# Patient Record
Sex: FEMALE | Employment: OTHER | URBAN - METROPOLITAN AREA
[De-identification: names, ages, dates, MRNs, and addresses within clinical notes are randomized per-mention and may not be internally consistent; named-entity substitution may affect disease eponyms.]

---

## 2018-09-12 ENCOUNTER — HOSPITAL ENCOUNTER (OUTPATIENT)
Age: 38
Discharge: HOME | End: 2018-09-12
Payer: COMMERCIAL

## 2018-09-12 ENCOUNTER — APPOINTMENT (OUTPATIENT)
Dept: ULTRASOUND IMAGING | Age: 38
End: 2018-09-12
Attending: NURSE PRACTITIONER
Payer: COMMERCIAL

## 2018-09-12 VITALS
WEIGHT: 108 LBS | DIASTOLIC BLOOD PRESSURE: 70 MMHG | SYSTOLIC BLOOD PRESSURE: 104 MMHG | HEART RATE: 84 BPM | TEMPERATURE: 98 F | RESPIRATION RATE: 16 BRPM | OXYGEN SATURATION: 100 %

## 2018-09-12 DIAGNOSIS — N83.202 CYST OF LEFT OVARY: Primary | ICD-10-CM

## 2018-09-12 DIAGNOSIS — R10.2 PELVIC PAIN: ICD-10-CM

## 2018-09-12 PROCEDURE — 96372 THER/PROPH/DIAG INJ SC/IM: CPT

## 2018-09-12 PROCEDURE — 76830 TRANSVAGINAL US NON-OB: CPT | Performed by: NURSE PRACTITIONER

## 2018-09-12 PROCEDURE — 99204 OFFICE O/P NEW MOD 45 MIN: CPT

## 2018-09-12 PROCEDURE — 99205 OFFICE O/P NEW HI 60 MIN: CPT

## 2018-09-12 PROCEDURE — 93975 VASCULAR STUDY: CPT | Performed by: NURSE PRACTITIONER

## 2018-09-12 PROCEDURE — 76856 US EXAM PELVIC COMPLETE: CPT | Performed by: NURSE PRACTITIONER

## 2018-09-12 RX ORDER — ACETAMINOPHEN 325 MG/1
650 TABLET ORAL ONCE
Status: COMPLETED | OUTPATIENT
Start: 2018-09-12 | End: 2018-09-12

## 2018-09-12 RX ORDER — NITROFURANTOIN 25; 75 MG/1; MG/1
100 CAPSULE ORAL 2 TIMES DAILY
Refills: 0 | COMMUNITY
Start: 2018-09-12 | End: 2018-10-01

## 2018-09-12 RX ORDER — KETOROLAC TROMETHAMINE 30 MG/ML
30 INJECTION, SOLUTION INTRAMUSCULAR; INTRAVENOUS ONCE
Status: COMPLETED | OUTPATIENT
Start: 2018-09-12 | End: 2018-09-12

## 2018-09-12 RX ORDER — TRAMADOL HYDROCHLORIDE 50 MG/1
50 TABLET ORAL EVERY 8 HOURS PRN
Qty: 10 TABLET | Refills: 0 | Status: SHIPPED | OUTPATIENT
Start: 2018-09-12 | End: 2018-09-19

## 2018-09-12 NOTE — ED NOTES
Patient put on call light. Needs to urinate. Ultrasound not available and is with patient. Patient informed and updated on plan .

## 2018-09-12 NOTE — ED NOTES
Patient states she has the urge to void. Phoned Seema. [employer] is with a patient at this time. Informed patient. Patient verbalized understanding. Will continue to monitor.

## 2018-09-12 NOTE — ED NOTES
Pt given 36oz of water to drink. Patient urinated just prior to arrival today. Patient updated on plan of care.

## 2018-09-12 NOTE — ED PROVIDER NOTES
Patient Seen in: [address] [facility] [place]    History   Patient presents with:  Urinary Symptoms (urologic)  Pelvic Pain    Stated Complaint: frequent urination    24-year-old female who presents to the immediate care with complaints of frequent ur negative except as noted above.     Physical Exam     ED Triage Vitals   BP 09/12/18 1217 125/79   Pulse 09/12/18 1217 83   Resp 09/12/18 1217 16   Temp 09/12/18 1220 100 °F (37.8 °C)   Temp src 09/12/18 1220 Temporal   SpO2 09/12/18 1217 100 %   O2 Device Capillary refill takes less than 2 seconds. Psychiatric: She has a normal mood and affect. Her behavior is normal. Judgment and thought content normal.   Nursing note and vitals reviewed.           ED Course   We will defer urine at this time, since derick Karie Jurado MD on 9/12/2018 at 14:58     Approved by: Karie Jurado MD                     [facility]   I have discussed with the patient and/or family the results of test, differential diagnosis, treatment plan, warning signs and symptoms which should prom

## 2018-10-01 ENCOUNTER — OFFICE VISIT (OUTPATIENT)
Dept: UROLOGY | Facility: HOSPITAL | Age: 38
End: 2018-10-01
Attending: OBSTETRICS & GYNECOLOGY
Payer: COMMERCIAL

## 2018-10-01 VITALS
DIASTOLIC BLOOD PRESSURE: 58 MMHG | WEIGHT: 104 LBS | SYSTOLIC BLOOD PRESSURE: 94 MMHG | HEIGHT: 61 IN | BODY MASS INDEX: 19.63 KG/M2

## 2018-10-01 DIAGNOSIS — R39.15 URINARY URGENCY: Primary | ICD-10-CM

## 2018-10-01 DIAGNOSIS — R35.0 URINARY FREQUENCY: ICD-10-CM

## 2018-10-01 DIAGNOSIS — N81.84 PELVIC MUSCLE WASTING: ICD-10-CM

## 2018-10-01 DIAGNOSIS — R39.89 BLADDER PAIN: ICD-10-CM

## 2018-10-01 PROCEDURE — 51700 IRRIGATION OF BLADDER: CPT

## 2018-10-01 PROCEDURE — 99201 HC OUTPT EVAL AND MGNT NEW PT LEVEL 1: CPT

## 2018-10-01 PROCEDURE — 81002 URINALYSIS NONAUTO W/O SCOPE: CPT

## 2018-10-01 RX ORDER — LIDOCAINE HYDROCHLORIDE 20 MG/ML
10 JELLY TOPICAL ONCE
Status: COMPLETED | OUTPATIENT
Start: 2018-10-01 | End: 2018-10-01

## 2018-10-01 RX ORDER — 0.9 % SODIUM CHLORIDE 0.9 %
17 VIAL (ML) INJECTION ONCE
Status: COMPLETED | OUTPATIENT
Start: 2018-10-01 | End: 2018-10-01

## 2018-10-01 RX ORDER — HEPARIN SODIUM 10000 [USP'U]/ML
40000 INJECTION, SOLUTION INTRAVENOUS; SUBCUTANEOUS ONCE
Status: COMPLETED | OUTPATIENT
Start: 2018-10-01 | End: 2018-10-01

## 2018-10-01 RX ORDER — TRAMADOL HYDROCHLORIDE 50 MG/1
50 TABLET ORAL EVERY 6 HOURS PRN
COMMUNITY
End: 2019-01-15

## 2018-10-01 RX ORDER — NAPROXEN 500 MG/1
TABLET ORAL
Refills: 0 | COMMUNITY
Start: 2018-09-17 | End: 2019-04-06

## 2018-10-01 RX ORDER — MULTIVIT-MIN/IRON FUM/FOLIC AC 7.5 MG-4
1 TABLET ORAL DAILY
COMMUNITY
End: 2020-01-02

## 2018-10-01 RX ADMIN — 0.9 % SODIUM CHLORIDE 17 ML: 0.9 % VIAL (ML) INJECTION at 10:45:00

## 2018-10-01 RX ADMIN — LIDOCAINE HYDROCHLORIDE 10 ML: 20 JELLY TOPICAL at 10:45:00

## 2018-10-01 RX ADMIN — HEPARIN SODIUM 40000 UNITS: 10000 INJECTION, SOLUTION INTRAVENOUS; SUBCUTANEOUS at 10:45:00

## 2018-10-01 NOTE — PATIENT INSTRUCTIONS
[address] PELVIC MEDICINE    BOWEL REGIMEN    Constipation can have detrimental effects on bladder function and can worsen the symptoms of prolapse. It is important to avoid constipation.     The first step for treating constipation is to i

## 2018-10-01 NOTE — PROCEDURES
.Patient here for instill #1. Instill given per protocol. Patient catheterized for a residual of 40 ml. Chemstrip performed. Patient tolerated procedure well. Next instill scheduled for next week.

## 2018-10-01 NOTE — PROGRESS NOTES
Janie Cabrera,   10/1/2018     Referred by Dr. Megha Devine    Patient presents with:  Urinary Frequency: referred by Dr. Megha Devine  Urinary Urgency: reports pressure and burning in bladder x 3mos  Other: painful bladder symptoms, negative culture x2 in last mo mouth. Disp:  Rfl:    Glucosamine-Chondroitin (GLUCOSAMINE CHONDR COMPLEX OR) Take by mouth. Disp:  Rfl:    Nitrofurantoin Monohyd Macro 100 MG Oral Cap Take 100 mg by mouth 2 (two) times daily.  Disp:  Rfl: 0   Terconazole 0.4 % Vaginal Cream Place 0.4 carlos manuel Stress Urinary Incontinence  Pelvic muscle rehabilitation including pelvic floor PT  Behavioral and pharmacologic treatments for IC/PBS  Bowel routine/constipation regimen  Discussed dietary and behavioral modification, discussed pharmacologic and nonpharm

## 2018-10-08 ENCOUNTER — OFFICE VISIT (OUTPATIENT)
Dept: UROLOGY | Facility: HOSPITAL | Age: 38
End: 2018-10-08
Attending: OBSTETRICS & GYNECOLOGY
Payer: COMMERCIAL

## 2018-10-08 VITALS
DIASTOLIC BLOOD PRESSURE: 68 MMHG | SYSTOLIC BLOOD PRESSURE: 120 MMHG | WEIGHT: 104 LBS | BODY MASS INDEX: 19.63 KG/M2 | HEIGHT: 61 IN

## 2018-10-08 DIAGNOSIS — R39.15 URINARY URGENCY: Primary | ICD-10-CM

## 2018-10-08 DIAGNOSIS — R35.0 URINARY FREQUENCY: ICD-10-CM

## 2018-10-08 DIAGNOSIS — R39.89 BLADDER PAIN: ICD-10-CM

## 2018-10-08 PROCEDURE — 51700 IRRIGATION OF BLADDER: CPT

## 2018-10-08 PROCEDURE — 81002 URINALYSIS NONAUTO W/O SCOPE: CPT

## 2018-10-08 NOTE — PROCEDURES
.Patient here for instill #2. Patient reports feeling better. Instill given per protocol. Patient catheterized for a residual of 20ml. Chemstrip performed. Patient tolerated procedure well. Next instill scheduled for 1 week.

## 2018-10-15 ENCOUNTER — OFFICE VISIT (OUTPATIENT)
Dept: UROLOGY | Facility: HOSPITAL | Age: 38
End: 2018-10-15
Attending: OBSTETRICS & GYNECOLOGY
Payer: COMMERCIAL

## 2018-10-15 VITALS — HEIGHT: 61 IN | BODY MASS INDEX: 19.63 KG/M2 | RESPIRATION RATE: 18 BRPM | WEIGHT: 104 LBS

## 2018-10-15 DIAGNOSIS — R39.89 BLADDER PAIN: ICD-10-CM

## 2018-10-15 DIAGNOSIS — R35.0 URINARY FREQUENCY: ICD-10-CM

## 2018-10-15 DIAGNOSIS — R39.15 URINARY URGENCY: Primary | ICD-10-CM

## 2018-10-15 PROCEDURE — 51700 IRRIGATION OF BLADDER: CPT

## 2018-10-15 PROCEDURE — 81002 URINALYSIS NONAUTO W/O SCOPE: CPT

## 2018-10-15 RX ORDER — LIDOCAINE HYDROCHLORIDE 20 MG/ML
10 JELLY TOPICAL ONCE
Status: COMPLETED | OUTPATIENT
Start: 2018-10-15 | End: 2018-10-15

## 2018-10-15 RX ORDER — 0.9 % SODIUM CHLORIDE 0.9 %
17 VIAL (ML) INJECTION ONCE
Status: COMPLETED | OUTPATIENT
Start: 2018-10-15 | End: 2018-10-15

## 2018-10-15 RX ORDER — HEPARIN SODIUM 10000 [USP'U]/ML
40000 INJECTION, SOLUTION INTRAVENOUS; SUBCUTANEOUS ONCE
Status: COMPLETED | OUTPATIENT
Start: 2018-10-15 | End: 2018-10-15

## 2018-10-15 RX ADMIN — 0.9 % SODIUM CHLORIDE 17 ML: 0.9 % VIAL (ML) INJECTION at 14:10:00

## 2018-10-15 RX ADMIN — LIDOCAINE HYDROCHLORIDE 10 ML: 20 JELLY TOPICAL at 14:10:00

## 2018-10-15 RX ADMIN — HEPARIN SODIUM 40000 UNITS: 10000 INJECTION, SOLUTION INTRAVENOUS; SUBCUTANEOUS at 14:10:00

## 2018-10-15 NOTE — PROCEDURES
.Patient here for instill #3. Patient reports feeling she is having many more good days than bad. Following bladder diet. Instill given per protocol. Patient catheterized for a residual of 10 ml. Chemstrip performed. Patient tolerated procedure well.

## 2018-10-25 ENCOUNTER — OFFICE VISIT (OUTPATIENT)
Dept: UROLOGY | Facility: HOSPITAL | Age: 38
End: 2018-10-25
Attending: OBSTETRICS & GYNECOLOGY
Payer: COMMERCIAL

## 2018-10-25 VITALS
WEIGHT: 104 LBS | SYSTOLIC BLOOD PRESSURE: 98 MMHG | HEIGHT: 61 IN | DIASTOLIC BLOOD PRESSURE: 60 MMHG | BODY MASS INDEX: 19.63 KG/M2

## 2018-10-25 DIAGNOSIS — R35.0 URINARY FREQUENCY: ICD-10-CM

## 2018-10-25 DIAGNOSIS — R39.89 BLADDER PAIN: Primary | ICD-10-CM

## 2018-10-25 DIAGNOSIS — R39.15 URINARY URGENCY: ICD-10-CM

## 2018-10-25 PROCEDURE — 81002 URINALYSIS NONAUTO W/O SCOPE: CPT

## 2018-10-25 PROCEDURE — 51700 IRRIGATION OF BLADDER: CPT

## 2018-10-25 RX ORDER — LIDOCAINE HYDROCHLORIDE 20 MG/ML
10 JELLY TOPICAL ONCE
Status: COMPLETED | OUTPATIENT
Start: 2018-10-25 | End: 2018-10-25

## 2018-10-25 RX ORDER — 0.9 % SODIUM CHLORIDE 0.9 %
17 VIAL (ML) INJECTION ONCE
Status: COMPLETED | OUTPATIENT
Start: 2018-10-25 | End: 2018-10-25

## 2018-10-25 RX ORDER — HEPARIN SODIUM 10000 [USP'U]/ML
40000 INJECTION, SOLUTION INTRAVENOUS; SUBCUTANEOUS ONCE
Status: COMPLETED | OUTPATIENT
Start: 2018-10-25 | End: 2018-10-25

## 2018-10-25 RX ADMIN — HEPARIN SODIUM 40000 UNITS: 10000 INJECTION, SOLUTION INTRAVENOUS; SUBCUTANEOUS at 14:00:00

## 2018-10-25 RX ADMIN — 0.9 % SODIUM CHLORIDE 17 ML: 0.9 % VIAL (ML) INJECTION at 14:00:00

## 2018-10-25 RX ADMIN — LIDOCAINE HYDROCHLORIDE 10 ML: 20 JELLY TOPICAL at 14:00:00

## 2018-11-01 ENCOUNTER — OFFICE VISIT (OUTPATIENT)
Dept: UROLOGY | Facility: HOSPITAL | Age: 38
End: 2018-11-01
Attending: OBSTETRICS & GYNECOLOGY
Payer: COMMERCIAL

## 2018-11-01 VITALS — DIASTOLIC BLOOD PRESSURE: 74 MMHG | SYSTOLIC BLOOD PRESSURE: 114 MMHG

## 2018-11-01 DIAGNOSIS — R39.15 URINARY URGENCY: Primary | ICD-10-CM

## 2018-11-01 PROCEDURE — 51700 IRRIGATION OF BLADDER: CPT

## 2018-11-01 PROCEDURE — 81002 URINALYSIS NONAUTO W/O SCOPE: CPT

## 2018-11-01 RX ORDER — LIDOCAINE HYDROCHLORIDE 20 MG/ML
10 JELLY TOPICAL ONCE
Status: COMPLETED | OUTPATIENT
Start: 2018-11-01 | End: 2018-11-01

## 2018-11-01 RX ORDER — HEPARIN SODIUM 10000 [USP'U]/ML
40000 INJECTION, SOLUTION INTRAVENOUS; SUBCUTANEOUS ONCE
Status: COMPLETED | OUTPATIENT
Start: 2018-11-01 | End: 2018-11-01

## 2018-11-01 RX ORDER — 0.9 % SODIUM CHLORIDE 0.9 %
17 VIAL (ML) INJECTION ONCE
Status: COMPLETED | OUTPATIENT
Start: 2018-11-01 | End: 2018-11-01

## 2018-11-01 RX ADMIN — LIDOCAINE HYDROCHLORIDE 10 ML: 20 JELLY TOPICAL at 08:52:00

## 2018-11-01 RX ADMIN — 0.9 % SODIUM CHLORIDE 17 ML: 0.9 % VIAL (ML) INJECTION at 08:52:00

## 2018-11-01 RX ADMIN — HEPARIN SODIUM 40000 UNITS: 10000 INJECTION, SOLUTION INTRAVENOUS; SUBCUTANEOUS at 08:52:00

## 2018-11-01 NOTE — PROCEDURES
.Patient here for instill #5 . Patient reports feeling improved - following bladder diet and has increased water intake -   Instill given per protocol. Patient catheterized for a residual of  30 ml. Chemstrip performed.   Patient tolerated procedure well

## 2018-11-07 ENCOUNTER — OFFICE VISIT (OUTPATIENT)
Dept: UROLOGY | Facility: HOSPITAL | Age: 38
End: 2018-11-07
Attending: OBSTETRICS & GYNECOLOGY
Payer: COMMERCIAL

## 2018-11-07 VITALS
SYSTOLIC BLOOD PRESSURE: 108 MMHG | BODY MASS INDEX: 19.63 KG/M2 | DIASTOLIC BLOOD PRESSURE: 62 MMHG | HEIGHT: 61 IN | WEIGHT: 104 LBS

## 2018-11-07 DIAGNOSIS — R39.15 URINARY URGENCY: Primary | ICD-10-CM

## 2018-11-07 DIAGNOSIS — R39.89 BLADDER PAIN: ICD-10-CM

## 2018-11-07 PROCEDURE — 51700 IRRIGATION OF BLADDER: CPT

## 2018-11-07 PROCEDURE — 81002 URINALYSIS NONAUTO W/O SCOPE: CPT

## 2018-11-07 RX ORDER — HEPARIN SODIUM 10000 [USP'U]/ML
40000 INJECTION, SOLUTION INTRAVENOUS; SUBCUTANEOUS ONCE
Status: COMPLETED | OUTPATIENT
Start: 2018-11-07 | End: 2018-11-07

## 2018-11-07 RX ORDER — 0.9 % SODIUM CHLORIDE 0.9 %
17 VIAL (ML) INJECTION ONCE
Status: COMPLETED | OUTPATIENT
Start: 2018-11-07 | End: 2018-11-07

## 2018-11-07 RX ORDER — LIDOCAINE HYDROCHLORIDE 20 MG/ML
10 JELLY TOPICAL ONCE
Status: COMPLETED | OUTPATIENT
Start: 2018-11-07 | End: 2018-11-07

## 2018-11-07 RX ADMIN — HEPARIN SODIUM 40000 UNITS: 10000 INJECTION, SOLUTION INTRAVENOUS; SUBCUTANEOUS at 14:58:00

## 2018-11-07 RX ADMIN — 0.9 % SODIUM CHLORIDE 17 ML: 0.9 % VIAL (ML) INJECTION at 14:58:00

## 2018-11-07 RX ADMIN — LIDOCAINE HYDROCHLORIDE 10 ML: 20 JELLY TOPICAL at 14:58:00

## 2018-11-19 ENCOUNTER — OFFICE VISIT (OUTPATIENT)
Dept: UROLOGY | Facility: HOSPITAL | Age: 38
End: 2018-11-19
Attending: OBSTETRICS & GYNECOLOGY
Payer: COMMERCIAL

## 2018-11-19 VITALS
HEIGHT: 61 IN | BODY MASS INDEX: 19.63 KG/M2 | SYSTOLIC BLOOD PRESSURE: 98 MMHG | DIASTOLIC BLOOD PRESSURE: 68 MMHG | WEIGHT: 104 LBS

## 2018-11-19 DIAGNOSIS — R39.89 BLADDER PAIN: Primary | ICD-10-CM

## 2018-11-19 DIAGNOSIS — R39.15 URINARY URGENCY: ICD-10-CM

## 2018-11-19 DIAGNOSIS — R35.0 URINARY FREQUENCY: ICD-10-CM

## 2018-11-19 PROCEDURE — 51700 IRRIGATION OF BLADDER: CPT

## 2018-11-19 PROCEDURE — 81002 URINALYSIS NONAUTO W/O SCOPE: CPT

## 2018-11-19 RX ORDER — HEPARIN SODIUM 10000 [USP'U]/ML
40000 INJECTION, SOLUTION INTRAVENOUS; SUBCUTANEOUS ONCE
Status: COMPLETED | OUTPATIENT
Start: 2018-11-19 | End: 2018-11-19

## 2018-11-19 RX ORDER — LIDOCAINE HYDROCHLORIDE 20 MG/ML
10 JELLY TOPICAL ONCE
Status: COMPLETED | OUTPATIENT
Start: 2018-11-19 | End: 2018-11-19

## 2018-11-19 RX ORDER — 0.9 % SODIUM CHLORIDE 0.9 %
17 VIAL (ML) INJECTION ONCE
Status: COMPLETED | OUTPATIENT
Start: 2018-11-19 | End: 2018-11-19

## 2018-11-19 RX ADMIN — LIDOCAINE HYDROCHLORIDE 10 ML: 20 JELLY TOPICAL at 10:15:00

## 2018-11-19 RX ADMIN — 0.9 % SODIUM CHLORIDE 17 ML: 0.9 % VIAL (ML) INJECTION at 10:15:00

## 2018-11-19 RX ADMIN — HEPARIN SODIUM 40000 UNITS: 10000 INJECTION, SOLUTION INTRAVENOUS; SUBCUTANEOUS at 10:15:00

## 2018-11-19 NOTE — PROCEDURES
.Patient here for instill #7. Patient reports feeling much  Better, feels many less flares and flares readily managed with Prelief, increase fluids, and dietary changes. Pt feels she doesn't need further scheduled instills. Instill given per protocol.

## 2019-01-15 ENCOUNTER — OFFICE VISIT (OUTPATIENT)
Dept: UROLOGY | Facility: HOSPITAL | Age: 39
End: 2019-01-15
Attending: OBSTETRICS & GYNECOLOGY
Payer: COMMERCIAL

## 2019-01-15 VITALS
BODY MASS INDEX: 19.63 KG/M2 | WEIGHT: 104 LBS | SYSTOLIC BLOOD PRESSURE: 100 MMHG | HEIGHT: 61 IN | DIASTOLIC BLOOD PRESSURE: 62 MMHG

## 2019-01-15 DIAGNOSIS — R35.0 URINARY FREQUENCY: Primary | ICD-10-CM

## 2019-01-15 DIAGNOSIS — R39.89 BLADDER PAIN: ICD-10-CM

## 2019-01-15 LAB
BLOOD URINE: NEGATIVE
CONTROL RUN WITHIN 24 HOURS?: YES
LEUKOCYTE ESTERASE URINE: NEGATIVE
NITRITE URINE: NEGATIVE

## 2019-01-15 PROCEDURE — 99211 OFF/OP EST MAY X REQ PHY/QHP: CPT

## 2019-01-15 NOTE — PROGRESS NOTES
Patient presents to follow up urinary urgency, PBS    She is currently using instills x7    She reports +improvement, but reports sx returned     /62   Ht 61\"   Wt 104 lb (47.2 kg)   BMI 19.65 kg/m²     GEN: NAD  CV: RRR  Pulm: nl effort  Abd: soft

## 2019-01-31 ENCOUNTER — APPOINTMENT (OUTPATIENT)
Dept: LAB | Age: 39
End: 2019-01-31
Attending: INTERNAL MEDICINE
Payer: COMMERCIAL

## 2019-01-31 DIAGNOSIS — R10.32 LLQ ABDOMINAL PAIN: ICD-10-CM

## 2019-01-31 DIAGNOSIS — R14.0 BLOATING: ICD-10-CM

## 2019-01-31 LAB
IMMUNOGLOBULIN A: 239 MG/DL (ref 70–312)
T4 FREE SERPL-MCNC: 1.2 NG/DL (ref 0.9–1.8)
TSI SER-ACNC: 1.4 MIU/ML (ref 0.35–5.5)

## 2019-01-31 PROCEDURE — 82784 ASSAY IGA/IGD/IGG/IGM EACH: CPT

## 2019-01-31 PROCEDURE — 83516 IMMUNOASSAY NONANTIBODY: CPT

## 2019-01-31 PROCEDURE — 84443 ASSAY THYROID STIM HORMONE: CPT

## 2019-01-31 PROCEDURE — 84439 ASSAY OF FREE THYROXINE: CPT

## 2019-01-31 PROCEDURE — 36415 COLL VENOUS BLD VENIPUNCTURE: CPT

## 2019-02-04 LAB
TISSUE TRANSGLUTAMINASE AB,IGA: 1.8 U/ML (ref ?–15)
TISSUE TRANSGLUTAMINASE IGA QUALITATIVE: NEGATIVE

## 2019-02-28 ENCOUNTER — TELEPHONE (OUTPATIENT)
Dept: UROLOGY | Facility: HOSPITAL | Age: 39
End: 2019-02-28

## 2019-02-28 NOTE — TELEPHONE ENCOUNTER
Returning patient call - upcoming visit on 3/5/19 - for instill f/u  - patient had instill f/u appt on

## 2019-03-05 ENCOUNTER — TELEPHONE (OUTPATIENT)
Dept: UROLOGY | Facility: HOSPITAL | Age: 39
End: 2019-03-05

## 2019-03-05 NOTE — TELEPHONE ENCOUNTER
Pt had called earlier to cx appt today, but then called RN line to ask if her IC could be related to sx of hnad and feet tingling. She also has sx of general muscle weakness. Advised pt IC would not likely have these sx and she should call her PCP to f/u.

## 2019-04-06 ENCOUNTER — OFFICE VISIT (OUTPATIENT)
Dept: FAMILY MEDICINE CLINIC | Facility: CLINIC | Age: 39
End: 2019-04-06
Payer: COMMERCIAL

## 2019-04-06 VITALS
DIASTOLIC BLOOD PRESSURE: 64 MMHG | HEART RATE: 71 BPM | RESPIRATION RATE: 18 BRPM | TEMPERATURE: 99 F | BODY MASS INDEX: 19 KG/M2 | SYSTOLIC BLOOD PRESSURE: 116 MMHG | OXYGEN SATURATION: 99 % | WEIGHT: 102 LBS

## 2019-04-06 DIAGNOSIS — H66.001 ACUTE SUPPURATIVE OTITIS MEDIA OF RIGHT EAR WITHOUT SPONTANEOUS RUPTURE OF TYMPANIC MEMBRANE, RECURRENCE NOT SPECIFIED: Primary | ICD-10-CM

## 2019-04-06 PROCEDURE — 99213 OFFICE O/P EST LOW 20 MIN: CPT | Performed by: NURSE PRACTITIONER

## 2019-04-06 RX ORDER — RANITIDINE 150 MG/1
150 TABLET ORAL
COMMUNITY
Start: 2019-03-25 | End: 2019-05-13

## 2019-04-06 RX ORDER — AMOXICILLIN 875 MG/1
875 TABLET, COATED ORAL 2 TIMES DAILY
Qty: 20 TABLET | Refills: 0 | Status: SHIPPED | OUTPATIENT
Start: 2019-04-06 | End: 2019-04-16

## 2019-04-06 NOTE — PATIENT INSTRUCTIONS
1. Rest. Drink plenty of fluids. 2. Amoxicillin as prescribed. 3. Supportive care as discussed. 4. Use humidifier at home when possible.   5. Follow up with PMD in 3-4 days for reeval. Follow up sooner or go to the emergency department immediately if sym Reviewed: 6/1/2016  © 3055-9847 [employer]. [address] [facility] [place] [facility] [place]. All rights reserved. This information is not intended as a substitute for professional medical care.  Always follow your healthcare professional's instruct

## 2019-04-06 NOTE — PROGRESS NOTES
CHIEF COMPLAINT:   Patient presents with:  Ear Pain      HPI:   Yosef Max is a non-toxic, well appearing 37year old female who presents today with complaints of right ear pain. Notes it has been present since this morning. Denies fevers.  Denies de Types: 1 Standard drinks or equivalent per week    Drug use: No       REVIEW OF SYSTEMS:   GENERAL: Denies any fevers, chills, or body aches. Denies weakness, syncope, falling.   SKIN: no unusual skin lesions or rashes  EYES: Denies blurred vision or do NEURO: Alert & Oriented x 3. CN II-XII intact. Moving all 4 extremities without difficulty. Romberg negative. Able to run heel down shin bilaterally without difficulty. Alternating supination/pronation of hands without dysdiadochokinesis.   No facial dr Pt did note intermittent dizziness earlier this week that has resolved.   By history there was association with head movement and no other concerning symptoms regarding possible etiologies such as headache, focal weakness, visual complaint, chest pain, irr · Finish all of the antibiotic medicine given, even though you may feel better after the first few days. · You may use over-the-counter medicine, such as acetaminophen or ibuprofen, to control pain and fever, unless something else was prescribed.  If you h

## 2019-04-22 ENCOUNTER — TELEPHONE (OUTPATIENT)
Dept: FAMILY MEDICINE CLINIC | Facility: CLINIC | Age: 39
End: 2019-04-22

## 2019-04-22 NOTE — TELEPHONE ENCOUNTER
Pt was advised DS is not taking NP- was advised she can see HM or KE.      Pt was transferred to  to set up new pt visit

## 2019-04-22 NOTE — TELEPHONE ENCOUNTER
Pt would like to know if Ds would take her as a new patient. DS was recommended by Sapna Aranda. Pt has been having Dizzy spells every day, Diarrhea everyday, Weight lose and blurred vision and always tired.  She just doesn't feel normal. She has seen a

## 2019-04-24 ENCOUNTER — OFFICE VISIT (OUTPATIENT)
Dept: FAMILY MEDICINE CLINIC | Facility: CLINIC | Age: 39
End: 2019-04-24
Payer: COMMERCIAL

## 2019-04-24 VITALS
HEIGHT: 61 IN | WEIGHT: 101 LBS | BODY MASS INDEX: 19.07 KG/M2 | HEART RATE: 68 BPM | TEMPERATURE: 99 F | SYSTOLIC BLOOD PRESSURE: 94 MMHG | DIASTOLIC BLOOD PRESSURE: 60 MMHG | RESPIRATION RATE: 16 BRPM

## 2019-04-24 DIAGNOSIS — R23.2 HOT FLASHES: ICD-10-CM

## 2019-04-24 DIAGNOSIS — R42 EPISODIC LIGHTHEADEDNESS: Primary | ICD-10-CM

## 2019-04-24 PROCEDURE — 99203 OFFICE O/P NEW LOW 30 MIN: CPT | Performed by: FAMILY MEDICINE

## 2019-04-24 PROCEDURE — 83002 ASSAY OF GONADOTROPIN (LH): CPT | Performed by: FAMILY MEDICINE

## 2019-04-24 PROCEDURE — 83001 ASSAY OF GONADOTROPIN (FSH): CPT | Performed by: FAMILY MEDICINE

## 2019-04-24 PROCEDURE — 85027 COMPLETE CBC AUTOMATED: CPT | Performed by: FAMILY MEDICINE

## 2019-04-24 PROCEDURE — 80053 COMPREHEN METABOLIC PANEL: CPT | Performed by: FAMILY MEDICINE

## 2019-04-24 PROCEDURE — 36415 COLL VENOUS BLD VENIPUNCTURE: CPT | Performed by: FAMILY MEDICINE

## 2019-04-24 NOTE — PROGRESS NOTES
HPI:    Patient ID: Yvrose Hoffman is a 37year old female. Patient presents with:  Establish Care      HPI  Patient is here to establish care. She is here with her boyfriend to discuss about fatigue and lightheadedness. Was seeing another physician. HISTORY:  Past Medical History:   Diagnosis Date   • Abdominal pain    • Anxiety    • Back pain    • Bad breath    • Bloating    • Blurred vision    • Constipation    • Diarrhea, unspecified    • Fatigue    • Fever    • Flatulence/gas pain/belching stressed out and the symptoms are related. She is not ready to start any medication. Discussed about seeing a therapist.  States she will think about it. No Suicidal or homicidal thoughts.   Will get an exercise stress test.  Will check for anemia, elect

## 2019-04-30 ENCOUNTER — TELEPHONE (OUTPATIENT)
Dept: UROLOGY | Facility: HOSPITAL | Age: 39
End: 2019-04-30

## 2019-04-30 ENCOUNTER — OFFICE VISIT (OUTPATIENT)
Dept: UROLOGY | Facility: HOSPITAL | Age: 39
End: 2019-04-30
Attending: OBSTETRICS & GYNECOLOGY
Payer: COMMERCIAL

## 2019-04-30 VITALS
HEIGHT: 61 IN | SYSTOLIC BLOOD PRESSURE: 103 MMHG | BODY MASS INDEX: 19.07 KG/M2 | WEIGHT: 101 LBS | DIASTOLIC BLOOD PRESSURE: 64 MMHG

## 2019-04-30 DIAGNOSIS — R35.0 URINARY FREQUENCY: Primary | ICD-10-CM

## 2019-04-30 DIAGNOSIS — R39.15 URINARY URGENCY: ICD-10-CM

## 2019-04-30 DIAGNOSIS — R39.89 BLADDER PAIN: ICD-10-CM

## 2019-04-30 PROCEDURE — 99211 OFF/OP EST MAY X REQ PHY/QHP: CPT

## 2019-04-30 NOTE — TELEPHONE ENCOUNTER
Pt called with c/o urinary urgency, back pain, pelvic pain, fatigue, and nausea. Reports diarrhea x 10 days prior to period but now more regular. Requesting to see Dr. Alex Abdi today. Spoke with Dr. Alex Abdi, ok for pt to be added to todays schedule and pt added.

## 2019-04-30 NOTE — PROGRESS NOTES
Patient presents to follow up bladder sx    She is currently using Prelief  Bowels irregular  Pain as bladder fills  Voids 2x/hr  Nocturia 0-->2-3    Sx resolved in past with instills    Heavy periods  Concern for ovarian ca  +tobacco hx    Pt upset and te

## 2019-04-30 NOTE — PROGRESS NOTES
Upon arrival, pt c/o SOB, chest pain, and a feeling of \"being off\". pt states started after running into building from rain. Pt color appears normal, Respirations 28- 30.  Pt states she is being worked up by her other doctors for everything she has going o

## 2019-05-01 ENCOUNTER — HOSPITAL ENCOUNTER (OUTPATIENT)
Dept: CV DIAGNOSTICS | Age: 39
Discharge: HOME | End: 2019-05-01
Attending: FAMILY MEDICINE
Payer: COMMERCIAL

## 2019-05-01 DIAGNOSIS — R42 EPISODIC LIGHTHEADEDNESS: ICD-10-CM

## 2019-05-01 PROCEDURE — 93018 CV STRESS TEST I&R ONLY: CPT | Performed by: FAMILY MEDICINE

## 2019-05-01 PROCEDURE — 93017 CV STRESS TEST TRACING ONLY: CPT | Performed by: FAMILY MEDICINE

## 2019-05-03 ENCOUNTER — TELEPHONE (OUTPATIENT)
Dept: FAMILY MEDICINE CLINIC | Facility: CLINIC | Age: 39
End: 2019-05-03

## 2019-05-03 DIAGNOSIS — R94.39 ABNORMAL STRESS TEST: Primary | ICD-10-CM

## 2019-05-03 NOTE — TELEPHONE ENCOUNTER
Called back Patient states Dr. Shabana Lam office is going to find out if they can see her next week. He is booked. States she has names of a few other cardiologist.  She will call me back if a referral is needed.   Advised patient not to exercise untilshe se

## 2019-05-03 NOTE — TELEPHONE ENCOUNTER
Patient states that when she spoke with Dr Brionna Acuña earlier today that Dr Brionna Acuña had referred her to see a Cardiologist, Dr Dawson Mcburney. She called his office and they will not be able to get her in to see Dr Dawson Mcburney for quite a while.  Possibly the middle to the e

## 2019-05-03 NOTE — TELEPHONE ENCOUNTER
Called Patient to discuss Stress test results. Explained stress test showed ST depression with exercise. Patient is advised not to do cardio exercise till she sees cardiology. Denies chest pain, palpitations and SOB. Discussed about worsening symptoms.  A

## 2019-05-06 ENCOUNTER — EXTERNAL RECORD (OUTPATIENT)
Dept: OTHER | Age: 39
End: 2019-05-06

## 2019-05-09 ENCOUNTER — TELEPHONE (OUTPATIENT)
Dept: NEUROLOGY | Facility: CLINIC | Age: 39
End: 2019-05-09

## 2019-05-09 NOTE — TELEPHONE ENCOUNTER
Called patient and informed her that [facility] is trying to find a place in schedule for her before her 5/23/2019 appointment. Informed patient GAGAN will update her tomorrow. Patient extremely appreciative.

## 2019-05-09 NOTE — TELEPHONE ENCOUNTER
Greta Miller MA at Dr. Karol Fuchs office. No open appointments at this time, need to discuss patient status.

## 2019-05-09 NOTE — TELEPHONE ENCOUNTER
Spoke with Dr. Uziel Patel and she states that the pt is having dizzy spells with numbness and weight loss (unexplained).  Dr. Giovany Silva would like do have some GI testing done, but wants to rule out neurology reasons why the patient is having this sympto

## 2019-05-10 NOTE — TELEPHONE ENCOUNTER
Called patient and offered 5/13/2019 appointment with Dr. Montesinos Alexis at 1000. Patient states she will take the appointment. Held appointment and information given to PSRs.

## 2019-05-13 ENCOUNTER — OFFICE VISIT (OUTPATIENT)
Dept: NEUROLOGY | Facility: CLINIC | Age: 39
End: 2019-05-13
Payer: COMMERCIAL

## 2019-05-13 VITALS
BODY MASS INDEX: 18 KG/M2 | SYSTOLIC BLOOD PRESSURE: 100 MMHG | WEIGHT: 97 LBS | RESPIRATION RATE: 16 BRPM | HEART RATE: 66 BPM | DIASTOLIC BLOOD PRESSURE: 68 MMHG

## 2019-05-13 DIAGNOSIS — R20.2 PARESTHESIA: Primary | ICD-10-CM

## 2019-05-13 PROBLEM — R27.0 ATAXIA: Status: ACTIVE | Noted: 2019-05-13

## 2019-05-13 PROCEDURE — 99244 OFF/OP CNSLTJ NEW/EST MOD 40: CPT | Performed by: OTHER

## 2019-05-13 RX ORDER — PANTOPRAZOLE SODIUM 40 MG/1
40 TABLET, DELAYED RELEASE ORAL
COMMUNITY
End: 2019-06-05

## 2019-05-13 NOTE — PROGRESS NOTES
Patient here for evaluation of unsteady balance for 2 months. Also having numbness in feet/hands. Does also have occasional nausea and blurred vision.

## 2019-05-13 NOTE — PROGRESS NOTES
[facility] CONSULTATION    Date of consult: 5/13/2019    CC: paresthesia, blurry vision, balance issue    HPI: Foreign Foster is a 37year old female with past medical history as listed below presents here for initial evaluation of paresthesi • Stool incontinence    • Uncomfortable fullness after meals    • Wears glasses    • Weight loss      Past Surgical History:   Procedure Laterality Date   •      • OTHER SURGICAL HISTORY      cone biopsy     Social History:  Social Histo medications. RTC after tests  Pt is advised to go ER for any new or worsening symptoms and contact office for above tests' results, any possible side effects from medication or other concerns.     Gonzalez Nick MD   Neurology  [facility]

## 2019-05-14 PROBLEM — K64.8 OTHER HEMORRHOIDS: Status: ACTIVE | Noted: 2019-05-14

## 2019-05-14 PROBLEM — R19.4 CHANGE IN BOWEL HABITS: Status: ACTIVE | Noted: 2019-05-14

## 2019-05-14 PROBLEM — R12 HEARTBURN: Status: ACTIVE | Noted: 2019-05-14

## 2019-05-14 PROBLEM — R14.1 GAS PAIN: Status: ACTIVE | Noted: 2019-05-14

## 2019-05-17 ENCOUNTER — OFFICE VISIT (OUTPATIENT)
Dept: FAMILY MEDICINE CLINIC | Facility: CLINIC | Age: 39
End: 2019-05-17
Payer: COMMERCIAL

## 2019-05-17 VITALS
HEART RATE: 73 BPM | TEMPERATURE: 98 F | RESPIRATION RATE: 14 BRPM | WEIGHT: 98 LBS | BODY MASS INDEX: 18.5 KG/M2 | SYSTOLIC BLOOD PRESSURE: 92 MMHG | OXYGEN SATURATION: 98 % | DIASTOLIC BLOOD PRESSURE: 70 MMHG | HEIGHT: 61 IN

## 2019-05-17 DIAGNOSIS — R10.13 EPIGASTRIC PAIN: Primary | ICD-10-CM

## 2019-05-17 DIAGNOSIS — IMO0002 LUMP: ICD-10-CM

## 2019-05-17 DIAGNOSIS — F41.8 ANXIETY ABOUT HEALTH: ICD-10-CM

## 2019-05-17 PROCEDURE — 36415 COLL VENOUS BLD VENIPUNCTURE: CPT | Performed by: FAMILY MEDICINE

## 2019-05-17 PROCEDURE — 83690 ASSAY OF LIPASE: CPT | Performed by: FAMILY MEDICINE

## 2019-05-17 PROCEDURE — 82150 ASSAY OF AMYLASE: CPT | Performed by: FAMILY MEDICINE

## 2019-05-17 PROCEDURE — 99213 OFFICE O/P EST LOW 20 MIN: CPT | Performed by: FAMILY MEDICINE

## 2019-05-17 RX ORDER — CITALOPRAM 10 MG/1
10 TABLET ORAL DAILY
Qty: 30 TABLET | Refills: 0 | Status: SHIPPED | OUTPATIENT
Start: 2019-05-17 | End: 2019-07-05

## 2019-05-21 ENCOUNTER — HOSPITAL ENCOUNTER (OUTPATIENT)
Dept: ULTRASOUND IMAGING | Age: 39
Discharge: HOME | End: 2019-05-21
Attending: FAMILY MEDICINE
Payer: COMMERCIAL

## 2019-05-21 DIAGNOSIS — IMO0002 LUMP: ICD-10-CM

## 2019-05-21 PROCEDURE — 76536 US EXAM OF HEAD AND NECK: CPT | Performed by: FAMILY MEDICINE

## 2019-05-23 ENCOUNTER — TELEPHONE (OUTPATIENT)
Dept: FAMILY MEDICINE CLINIC | Facility: CLINIC | Age: 39
End: 2019-05-23

## 2019-05-23 ENCOUNTER — TELEPHONE (OUTPATIENT)
Dept: NEUROLOGY | Facility: CLINIC | Age: 39
End: 2019-05-23

## 2019-05-23 DIAGNOSIS — E04.1 THYROID NODULE: Primary | ICD-10-CM

## 2019-05-23 RX ORDER — AMOXICILLIN AND CLAVULANATE POTASSIUM 875; 125 MG/1; MG/1
1 TABLET, FILM COATED ORAL 2 TIMES DAILY
Qty: 20 TABLET | Refills: 0 | Status: SHIPPED | OUTPATIENT
Start: 2019-05-23 | End: 2019-06-02

## 2019-05-23 NOTE — TELEPHONE ENCOUNTER
Discussed US results with Patient Explained US shows palpable blood vessel. Also she was found to have incidental thyroid nodule in left lobe. Also has mildly enlarge submandibular lymph nodes, likely reactive.    Referred to endo for further management of

## 2019-05-23 NOTE — TELEPHONE ENCOUNTER
Please call patient and let her know that I have placed a referral for Dr Tito Reynaga in [place] (Endocrinology). Phone # [phone]. Also I have sent the antibiotic to the pharmacy. She should Make an appointment in the office in 2 weeks. Thanks.

## 2019-05-23 NOTE — TELEPHONE ENCOUNTER
Call back from patient. States that Dr Andrew Manriquez can't see her until July and she states she can't wait that long. Call to Dr Taurus Cole office and spoke to nurse. Advised that all physicians in [place] office are booked until July.  States one physician is woody

## 2019-05-23 NOTE — TELEPHONE ENCOUNTER
Received faxed medical records from Dr. Jesus Castellanos office from [facility]. Sent to scanning.

## 2019-05-25 ENCOUNTER — TELEPHONE (OUTPATIENT)
Dept: FAMILY MEDICINE CLINIC | Facility: CLINIC | Age: 39
End: 2019-05-25

## 2019-05-25 NOTE — TELEPHONE ENCOUNTER
Received results from [facility] for CT of the lung. Reviewed by Dr Wendy Germain. Copy sent to scanning.

## 2019-05-30 ENCOUNTER — HOSPITAL ENCOUNTER (OUTPATIENT)
Dept: MRI IMAGING | Age: 39
Discharge: HOME | End: 2019-05-30
Attending: Other
Payer: COMMERCIAL

## 2019-05-30 ENCOUNTER — APPOINTMENT (OUTPATIENT)
Dept: LAB | Age: 39
End: 2019-05-30
Attending: Other
Payer: COMMERCIAL

## 2019-05-30 DIAGNOSIS — R20.2 PARESTHESIA: ICD-10-CM

## 2019-05-30 PROCEDURE — 70553 MRI BRAIN STEM W/O & W/DYE: CPT | Performed by: OTHER

## 2019-05-30 PROCEDURE — 84425 ASSAY OF VITAMIN B-1: CPT | Performed by: OTHER

## 2019-05-30 PROCEDURE — 36415 COLL VENOUS BLD VENIPUNCTURE: CPT | Performed by: OTHER

## 2019-05-30 PROCEDURE — A9575 INJ GADOTERATE MEGLUMI 0.1ML: HCPCS | Performed by: OTHER

## 2019-05-30 PROCEDURE — 84207 ASSAY OF VITAMIN B-6: CPT | Performed by: OTHER

## 2019-05-30 PROCEDURE — 82746 ASSAY OF FOLIC ACID SERUM: CPT | Performed by: OTHER

## 2019-06-03 ENCOUNTER — APPOINTMENT (OUTPATIENT)
Dept: CT IMAGING | Age: 39
End: 2019-06-03
Attending: FAMILY MEDICINE
Payer: COMMERCIAL

## 2019-06-03 ENCOUNTER — HOSPITAL ENCOUNTER (OUTPATIENT)
Age: 39
Discharge: HOME | End: 2019-06-03
Attending: FAMILY MEDICINE
Payer: COMMERCIAL

## 2019-06-03 VITALS
SYSTOLIC BLOOD PRESSURE: 101 MMHG | BODY MASS INDEX: 18.5 KG/M2 | HEIGHT: 61 IN | OXYGEN SATURATION: 100 % | RESPIRATION RATE: 20 BRPM | TEMPERATURE: 99 F | DIASTOLIC BLOOD PRESSURE: 65 MMHG | WEIGHT: 98 LBS | HEART RATE: 65 BPM

## 2019-06-03 DIAGNOSIS — R10.9 ABDOMINAL DISCOMFORT: Primary | ICD-10-CM

## 2019-06-03 DIAGNOSIS — E87.6 HYPOKALEMIA: ICD-10-CM

## 2019-06-03 DIAGNOSIS — N94.89 FEMALE PELVIC CONGESTION SYNDROME: ICD-10-CM

## 2019-06-03 PROCEDURE — 96360 HYDRATION IV INFUSION INIT: CPT

## 2019-06-03 PROCEDURE — 74177 CT ABD & PELVIS W/CONTRAST: CPT | Performed by: FAMILY MEDICINE

## 2019-06-03 PROCEDURE — 85025 COMPLETE CBC W/AUTO DIFF WBC: CPT | Performed by: FAMILY MEDICINE

## 2019-06-03 PROCEDURE — 80047 BASIC METABLC PNL IONIZED CA: CPT

## 2019-06-03 PROCEDURE — 99214 OFFICE O/P EST MOD 30 MIN: CPT

## 2019-06-03 PROCEDURE — 81025 URINE PREGNANCY TEST: CPT | Performed by: FAMILY MEDICINE

## 2019-06-03 PROCEDURE — 81002 URINALYSIS NONAUTO W/O SCOPE: CPT | Performed by: FAMILY MEDICINE

## 2019-06-03 RX ORDER — DICYCLOMINE HCL 20 MG
20 TABLET ORAL ONCE
Status: COMPLETED | OUTPATIENT
Start: 2019-06-03 | End: 2019-06-03

## 2019-06-03 RX ORDER — SODIUM CHLORIDE 9 MG/ML
1000 INJECTION, SOLUTION INTRAVENOUS ONCE
Status: COMPLETED | OUTPATIENT
Start: 2019-06-03 | End: 2019-06-03

## 2019-06-03 RX ORDER — POTASSIUM CHLORIDE 20 MEQ/1
40 TABLET, EXTENDED RELEASE ORAL ONCE
Status: COMPLETED | OUTPATIENT
Start: 2019-06-03 | End: 2019-06-03

## 2019-06-03 NOTE — ED PROVIDER NOTES
Patient Seen in: [address] [facility] [place]    History   Patient presents with:  Abdominal Pain  Nausea  Bloating    Stated Complaint: UPPER ABDOMINAL PAIN/WEAKNESS    HPI  This is a 36 yo F with extensive hx of GI issues and has been followed by GI Laterality Date   •      • OTHER SURGICAL HISTORY      cone biopsy       Family history reviewed and is not pertinent to presenting problem.     Social History    Tobacco Use      Smoking status: Former Smoker      Smokeless tobacco: Never following components:    ISTAT BUN 21 (*)     ISTAT Potassium 3.4 (*)     All other components within normal limits   POCT PREGNANCY, URINE - Normal   POCT CBC     Orders Placed This Encounter      CT ABDOMEN+PELVIS(CONTRAST ONLY)(CPT=74177) Once had LUQ pain, nausea, bloating and belching after eating since December. Denies vomiting and diarrhea. Last BM today. CONTRAST USED:  100cc of Omnipaque 350  FINDINGS:  LIVER:  No enlargement, atrophy, abnormal density, or significant focal lesion.   BILI while here in [address] [place] [address]. No signs of acute abdomen at this time. Advised to follow up wit GI. Food elimination also discussed that may prevent her symptoms. Possibilities include IBS, gastroeparesis, food sensitivities.      Patient verbalized understanding and

## 2019-06-03 NOTE — ED INITIAL ASSESSMENT (HPI)
Patient c/o LUQ pain. Nausea, bloating and belching after eating since December. Denies vomiting and diarrhea. Last BM today. Has been seen for this here, Ana Paula George, her PCP and by Dr Tray Mcmanus (GI).  Has had blood work, CT Abd, US of GB, EGD, Colonoscopy and a C

## 2019-06-05 ENCOUNTER — OFFICE VISIT (OUTPATIENT)
Dept: FAMILY MEDICINE CLINIC | Facility: CLINIC | Age: 39
End: 2019-06-05
Payer: COMMERCIAL

## 2019-06-05 ENCOUNTER — HOSPITAL ENCOUNTER (OUTPATIENT)
Dept: ULTRASOUND IMAGING | Age: 39
Discharge: HOME | End: 2019-06-05
Attending: OBSTETRICS & GYNECOLOGY
Payer: COMMERCIAL

## 2019-06-05 VITALS
DIASTOLIC BLOOD PRESSURE: 70 MMHG | SYSTOLIC BLOOD PRESSURE: 100 MMHG | HEART RATE: 60 BPM | WEIGHT: 98 LBS | BODY MASS INDEX: 19 KG/M2 | TEMPERATURE: 99 F | RESPIRATION RATE: 16 BRPM

## 2019-06-05 DIAGNOSIS — R59.0 ENLARGED LYMPH NODE IN NECK: Primary | ICD-10-CM

## 2019-06-05 DIAGNOSIS — R39.89 BLADDER PAIN: ICD-10-CM

## 2019-06-05 DIAGNOSIS — R14.0 ABDOMINAL BLOATING: ICD-10-CM

## 2019-06-05 PROCEDURE — 76856 US EXAM PELVIC COMPLETE: CPT | Performed by: OBSTETRICS & GYNECOLOGY

## 2019-06-05 PROCEDURE — 76830 TRANSVAGINAL US NON-OB: CPT | Performed by: OBSTETRICS & GYNECOLOGY

## 2019-06-05 PROCEDURE — 99213 OFFICE O/P EST LOW 20 MIN: CPT | Performed by: FAMILY MEDICINE

## 2019-06-05 NOTE — PROGRESS NOTES
HPI:    Patient ID: Jonathan Harper is a 37year old female. Patient presents with:  Lymph Node: follow up      HPI  Patient is here to f/u on enlarged submandibular lymph nodes. She was prescribed Augmentin for 10 days. Completed the course.  Denies any pain    • Bad breath    • Bloating    • Blurred vision    • Constipation    • Diarrhea, unspecified    • Fatigue    • Fever    • Flatulence/gas pain/belching    • Food intolerance    • Frequent urination    • IBS (irritable bowel syndrome)    • Indigestion ASSESSMENT/PLAN:     Chey Lafleur was seen today for lymph node. Diagnoses and all orders for this visit:    Enlarged lymph node in neck  Will get US neck to f/u on enlarged lymph nodes. -     US HEAD/NECK (RVN=[id number]);  Future    Abdominal bloating  Likely I

## 2019-06-06 ENCOUNTER — TELEPHONE (OUTPATIENT)
Dept: UROLOGY | Facility: HOSPITAL | Age: 39
End: 2019-06-06

## 2019-06-06 NOTE — TELEPHONE ENCOUNTER
Phoned pt and informed of normal pelvic US. Dr Zac Alvarado reviewed and felt essentially everything is normal. Feels varices are not contributing to pt sx. Pt verbalized an understanding.

## 2019-06-07 ENCOUNTER — LAB ENCOUNTER (OUTPATIENT)
Dept: LAB | Age: 39
End: 2019-06-07
Attending: OTOLARYNGOLOGY
Payer: COMMERCIAL

## 2019-06-07 DIAGNOSIS — E04.1 THYROID NODULE: ICD-10-CM

## 2019-06-07 DIAGNOSIS — E07.9 THYROID DISORDER: ICD-10-CM

## 2019-06-07 PROCEDURE — 86235 NUCLEAR ANTIGEN ANTIBODY: CPT

## 2019-06-07 PROCEDURE — 36415 COLL VENOUS BLD VENIPUNCTURE: CPT

## 2019-06-07 PROCEDURE — 86038 ANTINUCLEAR ANTIBODIES: CPT

## 2019-06-07 PROCEDURE — 86800 THYROGLOBULIN ANTIBODY: CPT

## 2019-06-07 PROCEDURE — 86376 MICROSOMAL ANTIBODY EACH: CPT

## 2019-06-07 PROCEDURE — 85652 RBC SED RATE AUTOMATED: CPT

## 2019-06-07 PROCEDURE — 86225 DNA ANTIBODY NATIVE: CPT

## 2019-06-11 ENCOUNTER — TELEPHONE (OUTPATIENT)
Dept: NEUROLOGY | Facility: CLINIC | Age: 39
End: 2019-06-11

## 2019-06-11 ENCOUNTER — HOSPITAL ENCOUNTER (OUTPATIENT)
Age: 39
Discharge: HOME | End: 2019-06-11
Attending: EMERGENCY MEDICINE
Payer: COMMERCIAL

## 2019-06-11 VITALS
RESPIRATION RATE: 16 BRPM | BODY MASS INDEX: 18.5 KG/M2 | HEIGHT: 61 IN | OXYGEN SATURATION: 99 % | DIASTOLIC BLOOD PRESSURE: 82 MMHG | SYSTOLIC BLOOD PRESSURE: 116 MMHG | WEIGHT: 98 LBS | TEMPERATURE: 98 F | HEART RATE: 72 BPM

## 2019-06-11 DIAGNOSIS — R31.0 GROSS HEMATURIA: Primary | ICD-10-CM

## 2019-06-11 PROCEDURE — 99214 OFFICE O/P EST MOD 30 MIN: CPT

## 2019-06-11 PROCEDURE — 87086 URINE CULTURE/COLONY COUNT: CPT | Performed by: EMERGENCY MEDICINE

## 2019-06-11 PROCEDURE — 81002 URINALYSIS NONAUTO W/O SCOPE: CPT | Performed by: EMERGENCY MEDICINE

## 2019-06-11 PROCEDURE — 81025 URINE PREGNANCY TEST: CPT | Performed by: EMERGENCY MEDICINE

## 2019-06-11 RX ORDER — FLUCONAZOLE 150 MG/1
150 TABLET ORAL ONCE
Qty: 1 TABLET | Refills: 0 | Status: SHIPPED | OUTPATIENT
Start: 2019-06-11 | End: 2019-06-11

## 2019-06-11 NOTE — TELEPHONE ENCOUNTER
S-pt calling with condition update  B-seen on 5/13/19 for unsteady balance, tingling/numbness, blurred vision. Testing recommended at last visit. Pt has completed blood work & MRI. Scheduled for EEG 6/13/19. A-calling with multiple symptoms.  They w

## 2019-06-11 NOTE — ED PROVIDER NOTES
Patient Seen in: [address] [facility] - [place], [place]    History   Patient presents with:  UTI    Stated Complaint: Blood in Urine,Abdominal/Back Pain,L.Leg Numbness, White on tongue    HPI    68-year-old female presents to the immediate care with multiple co for stated complaint: Blood in Urine,Abdominal/Back Pain,L.Leg Numbness, White on tongue  Other systems are as noted in HPI. Constitutional and vital signs reviewed. All other systems reviewed and negative except as noted above.     Physical Exam uncertain. Patient did have a CAT scan a couple of weeks ago which indicated that she has a small kidney stone which may precipitate this. As we do not have ultrasound here, no imaging was ordered at this time.   The patient was directed to the emergency

## 2019-06-11 NOTE — ED INITIAL ASSESSMENT (HPI)
Pt states she has had multiple health issues lately and has seen multiple doctors and has not had any diagnosis. Possible intersticial cystitis. Recently on amoxicillin for swollen lymph nodes. States finished it a while ago.  Today noticed blood in urine

## 2019-06-11 NOTE — TELEPHONE ENCOUNTER
Called pt to discuss below. States since speaking earlier, notes issues with urine. Is unsure if there is blood, is dark, foul smelling. Has pain in her lower back and left leg is numb and weak.      Informed pt that she should have these new symptoms addre

## 2019-06-11 NOTE — TELEPHONE ENCOUNTER
1. Go ER for evaluation for any life threatening or new severe symptoms that are not improving. 2. I am willing to see her sooner if pt prefers to be evaluated by me in office.

## 2019-06-13 ENCOUNTER — NURSE ONLY (OUTPATIENT)
Dept: NEUROLOGY | Facility: CLINIC | Age: 39
End: 2019-06-13
Payer: COMMERCIAL

## 2019-06-13 ENCOUNTER — TELEPHONE (OUTPATIENT)
Dept: FAMILY MEDICINE CLINIC | Facility: CLINIC | Age: 39
End: 2019-06-13

## 2019-06-13 DIAGNOSIS — R27.0 ATAXIA: ICD-10-CM

## 2019-06-13 PROCEDURE — 95816 EEG AWAKE AND DROWSY: CPT | Performed by: OTHER

## 2019-06-13 NOTE — PROGRESS NOTES
Please inform autoimmune testing positive for collin screen ssa ssb are negative recommend to proceed with rheumatology evaluation

## 2019-06-13 NOTE — TELEPHONE ENCOUNTER
Pt called, states that about a month ago she was prescribed anxiety medication but never started it. Now pt is having anxiety issues and she was wondering if the medication we prescribed a month ago is the right medication for her?    Please call pt at [phone]

## 2019-06-13 NOTE — TELEPHONE ENCOUNTER
Yes I would advise to go ahead and start the medication prescribed. F/U in office 2 weeks after she starts the medication. Thanks.

## 2019-06-13 NOTE — PROCEDURES
Date of Procedure: 6/13/2019    Procedure: EEG (ELECTROENCEPHALOGRAM)     DX: BALANCE ISSUE  HX: PT IS A 36 YO FEMALE WHO PRESENTS FOR BALANCE ISSUE AND BLURRY VISION.  PT HAS BALANCE ISSUE FOR LAST TWO MONTHS, NOTES TINGLING AND NUMBNESS IN LIMBS AS WELL A

## 2019-06-14 ENCOUNTER — OFFICE VISIT (OUTPATIENT)
Dept: NEUROLOGY | Facility: CLINIC | Age: 39
End: 2019-06-14
Payer: COMMERCIAL

## 2019-06-14 VITALS
DIASTOLIC BLOOD PRESSURE: 70 MMHG | RESPIRATION RATE: 16 BRPM | BODY MASS INDEX: 17.75 KG/M2 | HEIGHT: 61 IN | SYSTOLIC BLOOD PRESSURE: 96 MMHG | WEIGHT: 94 LBS

## 2019-06-14 DIAGNOSIS — R20.2 PARESTHESIA: Primary | ICD-10-CM

## 2019-06-14 DIAGNOSIS — R25.3 MUSCLE TWITCHING: ICD-10-CM

## 2019-06-14 PROCEDURE — 99215 OFFICE O/P EST HI 40 MIN: CPT | Performed by: OTHER

## 2019-06-14 NOTE — PROGRESS NOTES
[facility] Neurology outpatient progress note  Date of service: 6/14/2019    Patient here for a follow-up visit for multiple symptoms. Since last visit her symptoms have not improved.    MRI brain was normal.  EEG is normal.  Labs are unremarkable, except slightly Constipation    • Diarrhea, unspecified    • Fatigue    • Fever    • Flatulence/gas pain/belching    • Food intolerance    • Frequent urination    • IBS (irritable bowel syndrome)    • Indigestion    • Irregular bowel habits    • Nausea    • Pain with bertrand neurological conditions of undetermined etiology, her symptoms have not improved since last visit    Paresthesia  (primary encounter diagnosis)  Muscle twitching  Blurry vision  Balance disturbance  Anxiety  Family history of MS     Plan:  MRI brain normal

## 2019-06-14 NOTE — PROGRESS NOTES
Informed pt of results and recommendations per MICHAEL verb understanding. [facility] rheumatology scheduling number.

## 2019-06-28 ENCOUNTER — TELEPHONE (OUTPATIENT)
Dept: UROLOGY | Facility: HOSPITAL | Age: 39
End: 2019-06-28

## 2019-06-28 NOTE — TELEPHONE ENCOUNTER
TC to pt given 6/27 TC to office  \"Patient called today, had an episode of gross hematuria 6/11, went to urgent care, dip large blood, negative culture, no UA.  She also had some LLQ pain a few days after that, had difficulty voiding but was finally able

## 2019-07-05 RX ORDER — CITALOPRAM 10 MG/1
10 TABLET ORAL DAILY
Qty: 30 TABLET | Refills: 0 | Status: SHIPPED | OUTPATIENT
Start: 2019-07-05 | End: 2019-08-04

## 2019-07-05 NOTE — TELEPHONE ENCOUNTER
Last OV 6/5/19  Last ordered 5/17/19  According to a phone encounter 6/13/19 she did not start the celexa until 6/13. She was supposed to f/u in the office after being on it for 2 weeks.

## 2019-07-05 NOTE — TELEPHONE ENCOUNTER
Pt called, needs refill on Citalopram Hydrobromide (CELEXA) 10 MG Oral Tab. Pharmacy [address]   Please call pt at [phone]

## 2019-07-16 ENCOUNTER — TELEPHONE (OUTPATIENT)
Dept: NEUROLOGY | Facility: CLINIC | Age: 39
End: 2019-07-16

## 2019-07-23 ENCOUNTER — OFFICE VISIT (OUTPATIENT)
Dept: UROLOGY | Facility: HOSPITAL | Age: 39
End: 2019-07-23
Attending: OBSTETRICS & GYNECOLOGY
Payer: COMMERCIAL

## 2019-07-23 VITALS
WEIGHT: 97 LBS | SYSTOLIC BLOOD PRESSURE: 102 MMHG | HEIGHT: 61 IN | DIASTOLIC BLOOD PRESSURE: 68 MMHG | BODY MASS INDEX: 18.31 KG/M2

## 2019-07-23 DIAGNOSIS — R31.9 HEMATURIA: Primary | ICD-10-CM

## 2019-07-23 DIAGNOSIS — R35.0 URINARY FREQUENCY: ICD-10-CM

## 2019-07-23 LAB
CONTROL RUN WITHIN 24 HOURS?: YES
LEUKOCYTE ESTERASE URINE: NEGATIVE
NITRITE URINE: NEGATIVE

## 2019-07-23 PROCEDURE — 52000 CYSTOURETHROSCOPY: CPT

## 2019-07-23 PROCEDURE — 81002 URINALYSIS NONAUTO W/O SCOPE: CPT

## 2019-07-23 PROCEDURE — 88108 CYTOPATH CONCENTRATE TECH: CPT | Performed by: OBSTETRICS & GYNECOLOGY

## 2019-07-23 RX ORDER — LIDOCAINE HYDROCHLORIDE 20 MG/ML
10 JELLY TOPICAL ONCE
Status: COMPLETED | OUTPATIENT
Start: 2019-07-23 | End: 2019-07-23

## 2019-07-23 RX ADMIN — LIDOCAINE HYDROCHLORIDE 10 ML: 20 JELLY TOPICAL at 14:56:00

## 2019-07-23 NOTE — PATIENT INSTRUCTIONS
[address] [facility]  [address] [facility] [address]  [place] [address]  [facility]: [phone]  FAX: [phone]       Cystoscopy Discharge Instructions    You may have some mild discomfort today from your cystoscopy.   There may

## 2019-07-23 NOTE — PROGRESS NOTES
[facility], [place]. 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

## 2019-07-24 LAB — NON GYNE INTERPRETATION: NEGATIVE

## 2019-08-01 PROCEDURE — 86618 LYME DISEASE ANTIBODY: CPT | Performed by: INTERNAL MEDICINE

## 2019-08-01 PROCEDURE — 85610 PROTHROMBIN TIME: CPT | Performed by: INTERNAL MEDICINE

## 2019-08-01 PROCEDURE — 85732 THROMBOPLASTIN TIME PARTIAL: CPT | Performed by: INTERNAL MEDICINE

## 2019-08-01 PROCEDURE — 86235 NUCLEAR ANTIGEN ANTIBODY: CPT | Performed by: INTERNAL MEDICINE

## 2019-08-01 PROCEDURE — 86256 FLUORESCENT ANTIBODY TITER: CPT | Performed by: INTERNAL MEDICINE

## 2019-08-01 PROCEDURE — 83516 IMMUNOASSAY NONANTIBODY: CPT | Performed by: INTERNAL MEDICINE

## 2019-08-01 PROCEDURE — 86038 ANTINUCLEAR ANTIBODIES: CPT | Performed by: INTERNAL MEDICINE

## 2019-08-01 PROCEDURE — 85705 THROMBOPLASTIN INHIBITION: CPT | Performed by: INTERNAL MEDICINE

## 2019-08-01 PROCEDURE — 85613 RUSSELL VIPER VENOM DILUTED: CPT | Performed by: INTERNAL MEDICINE

## 2019-08-01 PROCEDURE — 86160 COMPLEMENT ANTIGEN: CPT | Performed by: INTERNAL MEDICINE

## 2019-08-05 RX ORDER — CITALOPRAM HYDROBROMIDE 10 MG/1
TABLET ORAL
Qty: 30 TABLET | Refills: 2 | Status: SHIPPED | OUTPATIENT
Start: 2019-08-05 | End: 2019-10-31 | Stop reason: DRUGHIGH

## 2019-08-07 ENCOUNTER — TELEPHONE (OUTPATIENT)
Dept: UROLOGY | Facility: HOSPITAL | Age: 39
End: 2019-08-07

## 2019-08-07 NOTE — TELEPHONE ENCOUNTER
Dr. Macrina Velasquez reviewed cytology from recent cysto, RN to notify pt of normal results. Called pt and notified of normal results, pt verbalizes understanding.

## 2019-09-11 PROCEDURE — 86800 THYROGLOBULIN ANTIBODY: CPT | Performed by: OTOLARYNGOLOGY

## 2019-09-11 PROCEDURE — 84481 FREE ASSAY (FT-3): CPT | Performed by: OTOLARYNGOLOGY

## 2019-09-11 PROCEDURE — 84443 ASSAY THYROID STIM HORMONE: CPT | Performed by: OTOLARYNGOLOGY

## 2019-09-11 PROCEDURE — 84439 ASSAY OF FREE THYROXINE: CPT | Performed by: OTOLARYNGOLOGY

## 2019-09-11 PROCEDURE — 86376 MICROSOMAL ANTIBODY EACH: CPT | Performed by: OTOLARYNGOLOGY

## 2019-09-12 NOTE — PROGRESS NOTES
HPI:    Patient ID: Keyana Solis is a 37year old female. Patient presents with:  Medication Follow-Up  Lab: la bs to be drawn from Dr Suad Lantigua      HPI  Patient is here to discuss about anxiety.  States she feels Celexa is working but during the day, Rash    • Stool incontinence    • Uncomfortable fullness after meals    • Wears glasses    • Weight loss       Past Surgical History:   Procedure Laterality Date   •      • OTHER SURGICAL HISTORY      cone biopsy      Family History   Prob

## 2019-09-12 NOTE — PROGRESS NOTES
Please inform tsh ft4 both normal ft3 is just barely low borderline/low thyroid abs negative for hashimotos.  Recommend she follow up after her FNA with Dr Jerez Began to further discuss

## 2019-09-17 ENCOUNTER — TELEPHONE (OUTPATIENT)
Dept: FAMILY MEDICINE CLINIC | Facility: CLINIC | Age: 39
End: 2019-09-17

## 2019-09-17 DIAGNOSIS — M54.2 NECK PAIN: Primary | ICD-10-CM

## 2019-09-17 NOTE — TELEPHONE ENCOUNTER
Please call Patient and let her know that I have placed a referral for Dr Yasmany Lai (Neurology). She can call their office to make an appointment. Thanks.

## 2019-09-17 NOTE — TELEPHONE ENCOUNTER
Patient requests referral to see Dr. Han Mckeon for a herniated disc in neck. Needs referral for consultation. States that chiropractor ordered an MRI and it showed this. Dr. Porter Degree office said that they need a referral. - phone - [phone].   Do you 0 = understands/communicates without difficulty

## 2019-10-23 ENCOUNTER — TELEPHONE (OUTPATIENT)
Dept: FAMILY MEDICINE CLINIC | Facility: CLINIC | Age: 39
End: 2019-10-23

## 2019-10-23 NOTE — TELEPHONE ENCOUNTER
Was she NOT agreeable to increasing celexa? Or does she want to increase that? We need to be careful the clonazapam, especially daily because it can definitely be habit-forming and you can build up a tolerance to it.      I am okay with her taking 1/2 tab

## 2019-10-23 NOTE — TELEPHONE ENCOUNTER
Pt states MH had given her Celexa for anxiety- pt has bene on it since June.   Pt was having increased anxiety and did want to increase citalopram.  Was also given Clonazepam to help intermittently    Pt states her anxiety is ramping up and then she stresse

## 2019-10-23 NOTE — TELEPHONE ENCOUNTER
Pt would like to talk to nurse about taking the  clonazePAM 0.5 MG Oral Tab   Daily for her panic attacks and anxiety.    Please return call to  [phone]

## 2019-10-23 NOTE — TELEPHONE ENCOUNTER
PT did not want to increase Celexa at the time she had a visit with . Pt states she will take the 1/2 tab of Clonazepam daily while she is having increased anxiety. Pt will keep visit with  next week and she does not need refills at this time.

## 2019-10-31 NOTE — PROGRESS NOTES
Juan Aldridge is a 37year old female. Patient presents with:  Medication Follow-Up      HPI:   Was put on celexa by Dr. Norah Chavez due to anxiety about health problems. Came back in for follow up, was still feeling anxious, was given clonazepam as needed.  A tobacco: Never Used      Tobacco comment: quit 5-6 years ago    Alcohol use:  Yes      Alcohol/week: 1.0 standard drinks      Types: 1 Standard drinks or equivalent per week      Comment: less than occasionally    Drug use: No       BP Readings from Last 6 encounter. Meds & Refills for this Visit:  Requested Prescriptions     Signed Prescriptions Disp Refills   • Citalopram Hydrobromide 20 MG Oral Tab 30 tablet 1     Sig: Take 1 tablet (20 mg total) by mouth daily.              The patient indica

## 2019-11-11 RX ORDER — CITALOPRAM 10 MG/1
TABLET ORAL
Qty: 30 TABLET | Refills: 2 | Status: SHIPPED | OUTPATIENT
Start: 2019-11-11 | End: 2020-01-02 | Stop reason: ALTCHOICE

## 2019-11-11 NOTE — TELEPHONE ENCOUNTER
Citalopram Hydrobromide 10 MG Oral Tab    [facility] IN [place]    PATIENT STATES THAT SHE DID GET THE RX FOR THE NEW DOSE OF 20 MG BUT HAS NOT STARTED IT YET.  THINKS SHE IS DOING OKAY ON THE 10 MG RIGHT NOW BUT HAS NO REFILLS LEFT ON THE 10 MG. WOULD LIKE TO S

## 2019-12-17 ENCOUNTER — TELEPHONE (OUTPATIENT)
Dept: FAMILY MEDICINE CLINIC | Facility: CLINIC | Age: 39
End: 2019-12-17

## 2019-12-17 DIAGNOSIS — M50.20 BULGING OF CERVICAL INTERVERTEBRAL DISC: ICD-10-CM

## 2019-12-17 DIAGNOSIS — M54.2 NECK PAIN: Primary | ICD-10-CM

## 2019-12-17 NOTE — TELEPHONE ENCOUNTER
We had discussed switching her clonazepam something else and getting her off of the clonazepam all together. She can go ahead and stop that any time. Can she see me in the office and we can talk more about all of these issues?

## 2019-12-17 NOTE — TELEPHONE ENCOUNTER
Patient notified and verbalized understanding.     appt scheduled    Routed to  for referral to Dr Sharona Young

## 2019-12-17 NOTE — TELEPHONE ENCOUNTER
Had MRI through chiropractor which showed a herniated disc. Patient saw Dr Shantelle Hernandez who recommended surgery and patient would like a second opinion from Dr Makayla Boss. Patient also has questions about medications.   Has concerns about addiction to clonazepam.

## 2019-12-26 ENCOUNTER — TELEPHONE (OUTPATIENT)
Dept: FAMILY MEDICINE CLINIC | Facility: CLINIC | Age: 39
End: 2019-12-26

## 2019-12-26 NOTE — TELEPHONE ENCOUNTER
Citalopram Hydrobromide 10 MG Oral Tab pt thinks she is having withdrals from this medicating. She is also worried about how long she has been taking it. Please call back.

## 2019-12-26 NOTE — TELEPHONE ENCOUNTER
PT states she is worried she is relying too much on clonazepam to calm her down from her worsening anxiety. She used to take this med very infrequent and now she finds herself taking 1 daily (started for about 1 week).  She was prescribed this on 12/5/2019

## 2020-01-02 ENCOUNTER — OFFICE VISIT (OUTPATIENT)
Dept: FAMILY MEDICINE CLINIC | Facility: CLINIC | Age: 40
End: 2020-01-02
Payer: COMMERCIAL

## 2020-01-02 VITALS
DIASTOLIC BLOOD PRESSURE: 60 MMHG | RESPIRATION RATE: 16 BRPM | BODY MASS INDEX: 19.83 KG/M2 | HEIGHT: 61 IN | HEART RATE: 72 BPM | WEIGHT: 105 LBS | SYSTOLIC BLOOD PRESSURE: 88 MMHG | TEMPERATURE: 99 F

## 2020-01-02 DIAGNOSIS — R53.82 CHRONIC FATIGUE: Primary | ICD-10-CM

## 2020-01-02 DIAGNOSIS — E55.9 VITAMIN D DEFICIENCY: ICD-10-CM

## 2020-01-02 DIAGNOSIS — E04.1 THYROID NODULE: ICD-10-CM

## 2020-01-02 DIAGNOSIS — R06.00 DOE (DYSPNEA ON EXERTION): ICD-10-CM

## 2020-01-02 DIAGNOSIS — F41.9 ANXIETY AND DEPRESSION: ICD-10-CM

## 2020-01-02 DIAGNOSIS — R42 DIZZINESS: ICD-10-CM

## 2020-01-02 DIAGNOSIS — F32.A ANXIETY AND DEPRESSION: ICD-10-CM

## 2020-01-02 LAB
ALBUMIN SERPL-MCNC: 3.7 G/DL (ref 3.4–5)
ALBUMIN/GLOB SERPL: 1.1 {RATIO} (ref 1–2)
ALP LIVER SERPL-CCNC: 41 U/L (ref 37–98)
ALT SERPL-CCNC: 17 U/L (ref 13–56)
ANION GAP SERPL CALC-SCNC: 4 MMOL/L (ref 0–18)
AST SERPL-CCNC: 19 U/L (ref 15–37)
BASOPHILS # BLD AUTO: 0.02 X10(3) UL (ref 0–0.2)
BASOPHILS NFR BLD AUTO: 0.3 %
BILIRUB SERPL-MCNC: 1.3 MG/DL (ref 0.1–2)
BUN BLD-MCNC: 18 MG/DL (ref 7–18)
BUN/CREAT SERPL: 24 (ref 10–20)
CALCIUM BLD-MCNC: 8.5 MG/DL (ref 8.5–10.1)
CHLORIDE SERPL-SCNC: 106 MMOL/L (ref 98–112)
CO2 SERPL-SCNC: 28 MMOL/L (ref 21–32)
CREAT BLD-MCNC: 0.75 MG/DL (ref 0.55–1.02)
CRP SERPL-MCNC: <0.29 MG/DL (ref ?–0.3)
DEPRECATED RDW RBC AUTO: 43.8 FL (ref 35.1–46.3)
EOSINOPHIL # BLD AUTO: 0.05 X10(3) UL (ref 0–0.7)
EOSINOPHIL NFR BLD AUTO: 0.8 %
ERYTHROCYTE [DISTWIDTH] IN BLOOD BY AUTOMATED COUNT: 13.2 % (ref 11–15)
GLOBULIN PLAS-MCNC: 3.3 G/DL (ref 2.8–4.4)
GLUCOSE BLD-MCNC: 93 MG/DL (ref 70–99)
HCT VFR BLD AUTO: 38.6 % (ref 35–48)
HGB BLD-MCNC: 12.6 G/DL (ref 12–16)
IMM GRANULOCYTES # BLD AUTO: 0.01 X10(3) UL (ref 0–1)
IMM GRANULOCYTES NFR BLD: 0.2 %
LYMPHOCYTES # BLD AUTO: 1.82 X10(3) UL (ref 1–4)
LYMPHOCYTES NFR BLD AUTO: 30.7 %
M PROTEIN MFR SERPL ELPH: 7 G/DL (ref 6.4–8.2)
MCH RBC QN AUTO: 29.7 PG (ref 26–34)
MCHC RBC AUTO-ENTMCNC: 32.6 G/DL (ref 31–37)
MCV RBC AUTO: 91 FL (ref 80–100)
MONOCYTES # BLD AUTO: 0.41 X10(3) UL (ref 0.1–1)
MONOCYTES NFR BLD AUTO: 6.9 %
NEUTROPHILS # BLD AUTO: 3.62 X10 (3) UL (ref 1.5–7.7)
NEUTROPHILS # BLD AUTO: 3.62 X10(3) UL (ref 1.5–7.7)
NEUTROPHILS NFR BLD AUTO: 61.1 %
OSMOLALITY SERPL CALC.SUM OF ELEC: 288 MOSM/KG (ref 275–295)
PATIENT FASTING Y/N/NP: NO
PLATELET # BLD AUTO: 184 10(3)UL (ref 150–450)
POTASSIUM SERPL-SCNC: 4 MMOL/L (ref 3.5–5.1)
RBC # BLD AUTO: 4.24 X10(6)UL (ref 3.8–5.3)
SED RATE-ML: 14 MM/HR (ref 0–25)
SODIUM SERPL-SCNC: 138 MMOL/L (ref 136–145)
TSI SER-ACNC: 1.75 MIU/ML (ref 0.36–3.74)
VIT D+METAB SERPL-MCNC: 33.6 NG/ML (ref 30–100)
WBC # BLD AUTO: 5.9 X10(3) UL (ref 4–11)

## 2020-01-02 PROCEDURE — 80050 GENERAL HEALTH PANEL: CPT | Performed by: FAMILY MEDICINE

## 2020-01-02 PROCEDURE — 82306 VITAMIN D 25 HYDROXY: CPT | Performed by: FAMILY MEDICINE

## 2020-01-02 PROCEDURE — 99214 OFFICE O/P EST MOD 30 MIN: CPT | Performed by: FAMILY MEDICINE

## 2020-01-02 PROCEDURE — 36415 COLL VENOUS BLD VENIPUNCTURE: CPT | Performed by: FAMILY MEDICINE

## 2020-01-02 PROCEDURE — 86140 C-REACTIVE PROTEIN: CPT | Performed by: FAMILY MEDICINE

## 2020-01-02 PROCEDURE — 85652 RBC SED RATE AUTOMATED: CPT | Performed by: FAMILY MEDICINE

## 2020-01-02 RX ORDER — FLUOXETINE 10 MG/1
10 CAPSULE ORAL DAILY
Qty: 30 CAPSULE | Refills: 1 | Status: SHIPPED | OUTPATIENT
Start: 2020-01-02

## 2020-01-02 NOTE — PROGRESS NOTES
Ranulfo Velez is a 37year old female. Patient presents with: Follow - Up: on medications      HPI:   Things aren't getting better. All of the issues have been going on since she started seeing Dr. Kylah Ch. Balance issues are still there as well. pain/belching    • Food intolerance    • Frequent urination    • IBS (irritable bowel syndrome)    • Indigestion    • Irregular bowel habits    • Nausea    • Pain with bowel movements    • Painful urination    • Rash    • Stool incontinence    • Uncomforta (dyspnea on exertion)  Dizziness  Anxiety and depression  - check labs as ordered. - orthostatics neg.   - check Holter, refer to cardiology. - follow up with neurology as well, consider doing MRI's as ordered.    - change from citalopram to fluoxetine, *Venipuncture              Meds & Refills for this Visit:  Requested Prescriptions     Signed Prescriptions Disp Refills   • FLUoxetine HCl 10 MG Oral Cap 30 capsule 1     Sig: Take 1 capsule (10 mg total) by mouth daily.              The patient indicates

## 2020-01-13 ENCOUNTER — OFFICE VISIT (OUTPATIENT)
Dept: PAIN CLINIC | Facility: CLINIC | Age: 40
End: 2020-01-13
Payer: COMMERCIAL

## 2020-01-13 VITALS
HEART RATE: 90 BPM | BODY MASS INDEX: 19.45 KG/M2 | DIASTOLIC BLOOD PRESSURE: 50 MMHG | HEIGHT: 61 IN | OXYGEN SATURATION: 98 % | WEIGHT: 103 LBS | SYSTOLIC BLOOD PRESSURE: 100 MMHG

## 2020-01-13 DIAGNOSIS — M54.12 CERVICAL RADICULOPATHY: Primary | ICD-10-CM

## 2020-01-13 PROCEDURE — 99204 OFFICE O/P NEW MOD 45 MIN: CPT | Performed by: ANESTHESIOLOGY

## 2020-01-13 NOTE — PROGRESS NOTES
PHYSICAL EXAM:   Physical Exam   Constitutional:           Patient presents in office today with reported weakness in the arms, neck right side into the right shoulder blade     Current pain level reported = 4/10    Location of Pain: weakness in the arms

## 2020-01-13 NOTE — PROGRESS NOTES
Name: Evalyn Osgood   : 1980   DOS: 2020     Chief complaint: Bilateral upper extremity pain    History of present illness:  Evalyn Osgood is a 37year old female complaining of pain in the upper extremity bilaterally.   The patient works as a • Diarrhea, unspecified    • Fatigue    • Fever    • Flatulence/gas pain/belching    • Food intolerance    • Frequent urination    • IBS (irritable bowel syndrome)    • Indigestion    • Irregular bowel habits    • Nausea    • Pain with bowel movements weakness, tremor, headaches, seizures, speech disorders, loss of balance or any  other neurologic problems. Genitourinary:  Denies dysuria, hematuria. Musculoskeletal:  Negative for all musculoskeletal problems. Vascular: Negative.  Specifically denies p negative bilaterally. Yeoman’s and Gaenslen’s test is negative bilaterally. Romberg test is negative. Straight leg raising test is negative bilaterally. Gowers’ sign is the negative. Dorsally pedis is palpable bilaterally.  Heel walking and toe walking is Patient ID: Attila Manrique is a 37year old female. HPI    Review of Systems         Current Outpatient Medications   Medication Sig Dispense Refill   • FLUoxetine HCl 10 MG Oral Cap Take 1 capsule (10 mg total) by mouth daily.  30 capsule 1   • clonazeP

## 2020-01-16 NOTE — TELEPHONE ENCOUNTER
Patient has noticed an increase in her anxiety lately she said there was a change in her medication recently. She isn't really sure what is going on but wants to talk to a nurse.

## 2020-01-16 NOTE — TELEPHONE ENCOUNTER
Did this start right away? Or getting worse since she started the fluoxetine? She can keep taking the clonazepam as needed. If possible, I would like her to give it one more week to see if she gets used to it, but she can stop it if she is miserable.

## 2020-01-16 NOTE — TELEPHONE ENCOUNTER
Patient states she is not feeling right since starting the fluoxetine. States she is feeling more shaky. States it is internal shakiness and anxiety. States every little thing is making her panic.   She when she takes the clonopin it helps her symptoms but

## 2020-01-16 NOTE — TELEPHONE ENCOUNTER
Patient notified and verbalized understanding. Is agreeable to meeting with [facility] for evaluation for psychiatry. Referral placed. Patient also wants to know if KE thinks her symptoms could be thyroid related.  T3 was off the last time Dr Ramez Mccarty matthew

## 2020-01-16 NOTE — TELEPHONE ENCOUNTER
We just checked her TSH a couple weeks ago. If the T3 or T4 is off, the TSH would have been off too. I wouldn't attribute symptoms to thyroid at this time.

## 2020-01-16 NOTE — TELEPHONE ENCOUNTER
Patient notified of KE comments regarding thyroid and verbalized understanding. Patient asking if she should restart citalopram when she stops fluoxetine.  Discussed with KE who states do not restart citalopram. Does not need to wean of fluoxetine as

## 2020-01-30 ENCOUNTER — OFFICE VISIT (OUTPATIENT)
Dept: SURGERY | Facility: CLINIC | Age: 40
End: 2020-01-30
Payer: COMMERCIAL

## 2020-01-30 VITALS
BODY MASS INDEX: 19.26 KG/M2 | SYSTOLIC BLOOD PRESSURE: 90 MMHG | WEIGHT: 102 LBS | HEIGHT: 61 IN | HEART RATE: 82 BPM | DIASTOLIC BLOOD PRESSURE: 60 MMHG

## 2020-01-30 DIAGNOSIS — G56.23 ULNAR NEUROPATHY OF BOTH UPPER EXTREMITIES: ICD-10-CM

## 2020-01-30 DIAGNOSIS — M50.20 CERVICAL DISC HERNIATION: ICD-10-CM

## 2020-01-30 DIAGNOSIS — M48.02 MYELOPATHY CONCURRENT WITH AND DUE TO SPINAL STENOSIS OF CERVICAL REGION (HCC): Primary | ICD-10-CM

## 2020-01-30 DIAGNOSIS — R29.2 HYPERREFLEXIA: ICD-10-CM

## 2020-01-30 DIAGNOSIS — M41.9 SCOLIOSIS OF LUMBAR SPINE, UNSPECIFIED SCOLIOSIS TYPE: ICD-10-CM

## 2020-01-30 DIAGNOSIS — M51.36 DDD (DEGENERATIVE DISC DISEASE), LUMBAR: ICD-10-CM

## 2020-01-30 DIAGNOSIS — M48.02 NEURAL FORAMINAL STENOSIS OF CERVICAL SPINE: ICD-10-CM

## 2020-01-30 DIAGNOSIS — G99.2 MYELOPATHY CONCURRENT WITH AND DUE TO SPINAL STENOSIS OF CERVICAL REGION (HCC): Primary | ICD-10-CM

## 2020-01-30 DIAGNOSIS — M50.30 DDD (DEGENERATIVE DISC DISEASE), CERVICAL: ICD-10-CM

## 2020-01-30 DIAGNOSIS — R25.8 CLONUS: ICD-10-CM

## 2020-01-30 PROCEDURE — 99204 OFFICE O/P NEW MOD 45 MIN: CPT | Performed by: PHYSICIAN ASSISTANT

## 2020-01-30 NOTE — PROGRESS NOTES
Location of Pain:  Pt states that she has been having issues with cervical pain. Pt states that has issues with bilateral arm pain. Pt states that she has issues with numbness and tingling in the bilateral arms.  Pt states that she has issues with weakness

## 2020-01-30 NOTE — PROGRESS NOTES
[facility] Neurosurgery Consultation      HISTORY OF PRESENT ILLNESS:Sofya Romero is a 37year old female    PAST MEDICAL HISTORY:  Past Medical History:   Diagnosis Date   • Abdominal pain    • Anxiety    • Back pain    • Bad breath    • Bloating    • Blurre Intrinsics   Right         5        5         5          5 5 5 5 5 5   Left         5        5         5          5 5 5 5 5 5     Lower extremity strength:     Iliopsoas  Hamstrings   Quads    D-flexion P-flexion Eversion   Right       5         5       5

## 2020-01-30 NOTE — PROGRESS NOTES
Patient: [address] Record Number: [id number]  YOB: 1980  PCP: Liana Jordan DO    Referring Physician: Dr. Liana Jordan  Reason for visit: Patient presents with:  New Patient: Neck Pain       Dear Dr. Liana Jordan,  Thank you very much for requ spinal cord compression. Wakes up at night with numbness to both arms. 4th and 5th digits fall asleep. Shakes the hands out, feels improved. Points to left trapezius pain. Pain down the left lower extremity. States all symptoms on the right.    Sees D 1 cup coffee per day        Exercise: No        Seat Belt: Yes     No Known Allergies   Current Medications:  Current Outpatient Medications   Medication Sig Dispense Refill   • NON FORMULARY Okra     • NON FORMULARY Pro sim biotic     • NON FORMULARY Víctor Sign Neg POS   Clonus Neg 1-2 beats, mild     Test Right   (POS or NEG) Left   (POS or NEG)   Ulnar Tinel's Neg POS     DATA:   None    IMAGING:   Outside imaging uploaded to PACs, see below    MEDICAL DECISION MAKING:     ASSESSMENT and PLAN:  (M48.02,  G scheduling. Advised to please call if she'd like to proceed. If she'd like an appointment for further discussion, recommend she schedule with a PA.    4. Follow up either after surgery or in 3 months for strength reassessment or call or follow up sooner or

## 2020-02-04 DIAGNOSIS — F41.9 ANXIETY: ICD-10-CM

## 2020-02-04 RX ORDER — CLONAZEPAM 0.5 MG/1
0.5 TABLET ORAL 2 TIMES DAILY PRN
Qty: 30 TABLET | Refills: 0 | Status: SHIPPED | OUTPATIENT
Start: 2020-02-04

## 2020-02-04 NOTE — TELEPHONE ENCOUNTER
clonazePAM 0.5 MG Oral Tab  [id number] [place], [place] - [address], [phone], [phone]

## 2020-02-10 NOTE — PROGRESS NOTES
HPI:    Patient ID: Foreign Foster is a 37year old female. Patient presents with:  Stomach Pain: happens when digesting. pending endoscopy and colonscopy results      HPI  Patient is here for epigastric pain for 2 days.  States pain started all of a elxy Ideally she should not skip taking these medications.     I will be more than happy to send a one-month refill to local pharmacy in [place] if she provides us with pharmacy of choice  information  Thank you Abdominal pain    • Anxiety    • Back pain    • Bad breath    • Bloating    • Blurred vision    • Constipation    • Diarrhea, unspecified    • Fatigue    • Fever    • Flatulence/gas pain/belching    • Food intolerance    • Frequent urination    • IBS (irri IBS. Low suspicion of Pancreatitis. Will get pancreatic enzymes. EGD and Colonoscopy results are pending. Lost a couple of pounds since her last visit I April.  -     AMYLASE; Future  -     LIPASE;  Future    Anxiety about health  Patient has been to multip

## 2020-02-28 ENCOUNTER — HOSPITAL ENCOUNTER (OUTPATIENT)
Dept: GENERAL RADIOLOGY | Age: 40
Discharge: HOME | End: 2020-02-28
Attending: FAMILY MEDICINE
Payer: COMMERCIAL

## 2020-02-28 DIAGNOSIS — R06.00 DOE (DYSPNEA ON EXERTION): ICD-10-CM

## 2020-02-28 DIAGNOSIS — R42 DIZZINESS: ICD-10-CM

## 2020-02-28 PROCEDURE — 93225 XTRNL ECG REC<48 HRS REC: CPT | Performed by: FAMILY MEDICINE

## 2020-02-28 PROCEDURE — 93227 XTRNL ECG REC<48 HR R&I: CPT | Performed by: FAMILY MEDICINE

## 2020-03-17 ENCOUNTER — TELEPHONE (OUTPATIENT)
Dept: FAMILY MEDICINE CLINIC | Facility: CLINIC | Age: 40
End: 2020-03-17

## 2020-03-17 NOTE — TELEPHONE ENCOUNTER
Patient states this has been going on for a few months. Feels unsteady. Seems to be happening every day, all day. Craves sugar toward the end of the day. Feels bloated when she eats even a small amount. States she is sweating at night.   Still having

## 2020-03-17 NOTE — TELEPHONE ENCOUNTER
Has she been checking sugars? If not, I can send a script or she can get one OTC. Insurance may not pay for it if you are not diabetic, but I think it would be worth trying to see how low your sugars are when you are feeling bad.  Otherwise, maybe we need t

## 2020-03-17 NOTE — TELEPHONE ENCOUNTER
Asking to do a screening she feels she is having issues with her blood sugars  Nervousness confusion hard walker digesting food dry mouth    Please call back

## 2020-03-17 NOTE — TELEPHONE ENCOUNTER
Patient notified and verbalized understanding. States she has a GI but has not seen in a while. Patient will check with [facility] to see what the cost of their OTC brand of meter is. Will let office know if she needs order sent.

## 2020-05-19 ENCOUNTER — TELEPHONE (OUTPATIENT)
Dept: SURGERY | Facility: CLINIC | Age: 40
End: 2020-05-19

## 2020-05-19 NOTE — TELEPHONE ENCOUNTER
Pt calling to see if she can be seen in office for strength assessment that she was due for but was cancelled due to Cali Burnett, please advise; pt asking for call back or [facility]

## 2020-05-21 ENCOUNTER — TELEPHONE (OUTPATIENT)
Dept: FAMILY MEDICINE CLINIC | Facility: CLINIC | Age: 40
End: 2020-05-21

## 2020-05-21 ENCOUNTER — TELEMEDICINE (OUTPATIENT)
Dept: FAMILY MEDICINE CLINIC | Facility: CLINIC | Age: 40
End: 2020-05-21
Payer: COMMERCIAL

## 2020-05-21 DIAGNOSIS — R25.3 MUSCLE FASCICULATION: Primary | ICD-10-CM

## 2020-05-21 DIAGNOSIS — R53.1 LEFT-SIDED WEAKNESS: ICD-10-CM

## 2020-05-21 PROCEDURE — 99214 OFFICE O/P EST MOD 30 MIN: CPT | Performed by: FAMILY MEDICINE

## 2020-05-21 NOTE — TELEPHONE ENCOUNTER
Katy Cantrell verbally {consents to a [employer] on 05/21/20.   Patient understands and accepts financial responsibility for any deductible, co-insurance and/or co-pays associated with this service;

## 2020-05-21 NOTE — PROGRESS NOTES
This is a telemedicine visit with live, interactive video and audio. Patient understands and accepts financial responsibility for any deductible, co-insurance and/or co-pays associated with this service. SUBJECTIVE  Has to pee more frequently.    Who Smoking status: Former Smoker        Packs/day: 0.00      Smokeless tobacco: Never Used      Tobacco comment: quit 5-6 years ago    Alcohol use:  Yes      Alcohol/week: 1.0 standard drinks      Types: 1 Standard drinks or equivalent per week      Comment: done as previously ordered and/or EMG testing. Pt understands and agrees with plan. Mihir Winn, DO    Please note that the following visit was completed using two-way, real-time interactive audio and/or video communication.   This has been done in

## 2020-05-22 ENCOUNTER — APPOINTMENT (OUTPATIENT)
Dept: LAB | Age: 40
End: 2020-05-22
Attending: FAMILY MEDICINE
Payer: COMMERCIAL

## 2020-05-22 DIAGNOSIS — R25.3 MUSCLE FASCICULATION: ICD-10-CM

## 2020-05-22 DIAGNOSIS — R53.1 LEFT-SIDED WEAKNESS: ICD-10-CM

## 2020-05-22 PROCEDURE — 80053 COMPREHEN METABOLIC PANEL: CPT | Performed by: FAMILY MEDICINE

## 2020-05-22 PROCEDURE — 82607 VITAMIN B-12: CPT | Performed by: FAMILY MEDICINE

## 2020-05-22 PROCEDURE — 82746 ASSAY OF FOLIC ACID SERUM: CPT | Performed by: FAMILY MEDICINE

## 2020-05-22 PROCEDURE — 83036 HEMOGLOBIN GLYCOSYLATED A1C: CPT | Performed by: FAMILY MEDICINE

## 2020-05-22 PROCEDURE — 86780 TREPONEMA PALLIDUM: CPT | Performed by: FAMILY MEDICINE

## 2020-05-22 PROCEDURE — 36415 COLL VENOUS BLD VENIPUNCTURE: CPT | Performed by: FAMILY MEDICINE

## 2020-05-28 ENCOUNTER — TELEMEDICINE (OUTPATIENT)
Dept: NEUROLOGY | Facility: CLINIC | Age: 40
End: 2020-05-28
Payer: COMMERCIAL

## 2020-05-28 DIAGNOSIS — R20.9 DISTURBANCE OF SKIN SENSATION: ICD-10-CM

## 2020-05-28 DIAGNOSIS — R53.1 LEFT-SIDED WEAKNESS: Primary | ICD-10-CM

## 2020-05-28 DIAGNOSIS — R27.0 ATAXIA: ICD-10-CM

## 2020-05-28 PROCEDURE — 99214 OFFICE O/P EST MOD 30 MIN: CPT | Performed by: OTHER

## 2020-05-28 NOTE — PROGRESS NOTES
Neurology H&P    Mayte Beck Patient Status:  No patient class for patient encounter    1980 MRN [id number]   Location ED [facility], [address], [address] Attending No att. providers found   Hosp Day # 0 PCP Denver San, DO     Subj Zypan     • NON FORMULARY AF Beta     • MAGNESIUM LACTATE OR Take 1 tablet by mouth daily. • Cyanocobalamin (VITAMIN B 12 OR) Take by mouth.      • NON FORMULARY cloifil 2     • NON FORMULARY      • FLUoxetine HCl 10 MG Oral Cap Take 1 capsule (10 mg to moist mucus membranes  Neck: normal appearing ROM     Pulm: no increase work of breathing               Neurologic:   MENTAL STATUS: alert, ox3, normal attention, language and fund of knowledge.       CRANIAL NERVES II to XII: PERRLA, no ptosis or diplopia, 5th digit numbness (intermittent), dry eyes, muscle twitching over multiple sites on her body and reports worsening/new symptoms of L facial \"tightness\". She has been seen by other specialist including neurology and neurosurgery for these symptoms.  She d

## 2020-06-02 NOTE — TELEPHONE ENCOUNTER
Called pt, unable to LM, phone rang 10+ times    [facility] advising pt to call to schedule

## 2020-09-02 ENCOUNTER — TELEPHONE (OUTPATIENT)
Dept: FAMILY MEDICINE CLINIC | Facility: CLINIC | Age: 40
End: 2020-09-02

## 2020-12-23 ENCOUNTER — IMMUNIZATION (OUTPATIENT)
Dept: FAMILY MEDICINE CLINIC | Facility: CLINIC | Age: 40
End: 2020-12-23
Payer: COMMERCIAL

## 2020-12-23 DIAGNOSIS — Z23 NEED FOR VACCINATION: ICD-10-CM

## 2020-12-23 PROCEDURE — 90471 IMMUNIZATION ADMIN: CPT | Performed by: FAMILY MEDICINE

## 2020-12-23 PROCEDURE — 90686 IIV4 VACC NO PRSV 0.5 ML IM: CPT | Performed by: FAMILY MEDICINE

## 2021-02-24 ENCOUNTER — TELEPHONE (OUTPATIENT)
Dept: FAMILY MEDICINE CLINIC | Facility: CLINIC | Age: 41
End: 2021-02-24

## 2021-08-03 NOTE — ED NOTES
Informed patient of no growth with her urine culture. Patient scheduled SIS US for 8/31 @ 1:10pm/Dr. Bowles @ 1:45pm

## 2021-12-10 NOTE — TELEPHONE ENCOUNTER
TC to pt  She is feeling better overall  Bladder sx improved  Some hesitancy previous has resolved  An episode of gross hematuria  Needs office cysto, will work to schedule  All questions answered  She understands and agrees to plan  Jeremias Ricardo no

## 2022-10-12 ENCOUNTER — TELEPHONE (OUTPATIENT)
Dept: FAMILY MEDICINE CLINIC | Facility: CLINIC | Age: 42
End: 2022-10-12

## 2022-10-12 NOTE — TELEPHONE ENCOUNTER
PATIENT KNOWS DR FOX IS NOT IN OFFICE TODAY. PATIENT SAYS THERE IS A 90 MINUTE WAIT AT [facility]. PATIENT DOESN'T KNOW IF SHE HAS AN EAR INFECTION OR SINUS INFECTION. HE LEFT EAR IS HAVING SOME PRESSURE. HER HEARING IS MUFFLED. PATIENT IS LOOKING FOR RECOMMENDATION.

## 2022-10-12 NOTE — TELEPHONE ENCOUNTER
Pt states she has not taken a COVID test.    Pt asked that this be routed to PCP to address when KE is in office

## 2022-10-12 NOTE — TELEPHONE ENCOUNTER
Pt states L ear is plugged up. Pt tried to go to Walk in clinic but it was 90 minute wait. Pt states she had cold- and now it has moved from sinues to ear. She is still having pressure. Pt has been steaming face and doing natural remedies. Pt states everything draining is yellow. Pt states she had PND prior to the sinus issues and pressure.     Routing to covering provider

## 2022-10-13 ENCOUNTER — OFFICE VISIT (OUTPATIENT)
Dept: FAMILY MEDICINE CLINIC | Facility: CLINIC | Age: 42
End: 2022-10-13
Payer: COMMERCIAL

## 2022-10-13 VITALS
OXYGEN SATURATION: 98 % | WEIGHT: 108 LBS | SYSTOLIC BLOOD PRESSURE: 106 MMHG | BODY MASS INDEX: 20.39 KG/M2 | DIASTOLIC BLOOD PRESSURE: 68 MMHG | HEART RATE: 74 BPM | RESPIRATION RATE: 16 BRPM | HEIGHT: 61 IN | TEMPERATURE: 98 F

## 2022-10-13 DIAGNOSIS — H65.92 LEFT NON-SUPPURATIVE OTITIS MEDIA: Primary | ICD-10-CM

## 2022-10-13 PROCEDURE — 3008F BODY MASS INDEX DOCD: CPT | Performed by: FAMILY MEDICINE

## 2022-10-13 PROCEDURE — 99214 OFFICE O/P EST MOD 30 MIN: CPT | Performed by: FAMILY MEDICINE

## 2022-10-13 PROCEDURE — 3074F SYST BP LT 130 MM HG: CPT | Performed by: FAMILY MEDICINE

## 2022-10-13 PROCEDURE — 3078F DIAST BP <80 MM HG: CPT | Performed by: FAMILY MEDICINE

## 2022-10-13 RX ORDER — PAROXETINE HYDROCHLORIDE 12.5 MG/1
12.5 TABLET, FILM COATED, EXTENDED RELEASE ORAL DAILY
COMMUNITY
Start: 2022-10-08

## 2022-10-13 RX ORDER — AMOXICILLIN AND CLAVULANATE POTASSIUM 875; 125 MG/1; MG/1
1 TABLET, FILM COATED ORAL 2 TIMES DAILY
Qty: 20 TABLET | Refills: 0 | Status: SHIPPED | OUTPATIENT
Start: 2022-10-13 | End: 2022-10-23

## 2022-10-13 NOTE — TELEPHONE ENCOUNTER
Patient notified and scheduled appt  Future Appointments   Date Time Provider Gracy Ye   10/13/2022  1:30 PM Vianne Lennox, [facility] [place]

## 2022-10-13 NOTE — TELEPHONE ENCOUNTER
I can see her at 9 am, 1:30 or 2:30 today if she wants to be evaluated. I'm not going to call something in without seeing her.

## (undated) NOTE — LETTER
02/01/20  Tonie Jang      Dear Attila Manrique. To help us provide the highest quality medical care, [facility] uses a sophisticated computer system to track our patient records.  During a review of

## (undated) NOTE — LETTER
07/16/19      Dear Cristina Dunn,    We are contacting you from Dr. Terry Thompson office. Your health is important to us.   Therefore, we are sending this friendly reminder that you have the following overdue labs and/or tests which were ordered at your last off

## (undated) NOTE — LETTER
[address]  [id number]  [place] [address]  [place]: [phone]  FAX: [phone]   Consent to Procedure/Sedation    Date: __7/23/2019_____    Time: ___2:03 PM ___    1.  I authorize the performance ___________________________    Signature of person authorized to consent for patient: Relationship to patient:  ___________________________    ___________________    Witness: ____________________     Date: ______________    Printed: 7/23/2019   2:03 PM